# Patient Record
Sex: MALE | Race: WHITE | NOT HISPANIC OR LATINO | Employment: FULL TIME | URBAN - METROPOLITAN AREA
[De-identification: names, ages, dates, MRNs, and addresses within clinical notes are randomized per-mention and may not be internally consistent; named-entity substitution may affect disease eponyms.]

---

## 2023-03-21 ENCOUNTER — OFFICE VISIT (OUTPATIENT)
Dept: MEDICAL GROUP | Facility: LAB | Age: 28
End: 2023-03-21
Payer: COMMERCIAL

## 2023-03-21 VITALS
WEIGHT: 167 LBS | TEMPERATURE: 97 F | HEART RATE: 72 BPM | BODY MASS INDEX: 24.73 KG/M2 | OXYGEN SATURATION: 97 % | DIASTOLIC BLOOD PRESSURE: 70 MMHG | RESPIRATION RATE: 12 BRPM | HEIGHT: 69 IN | SYSTOLIC BLOOD PRESSURE: 110 MMHG

## 2023-03-21 DIAGNOSIS — F90.2 ATTENTION DEFICIT HYPERACTIVITY DISORDER (ADHD), COMBINED TYPE: ICD-10-CM

## 2023-03-21 DIAGNOSIS — E04.1 THYROID NODULE: ICD-10-CM

## 2023-03-21 DIAGNOSIS — Z76.89 ENCOUNTER TO ESTABLISH CARE: ICD-10-CM

## 2023-03-21 DIAGNOSIS — R10.9 ABDOMINAL PAIN, UNSPECIFIED ABDOMINAL LOCATION: ICD-10-CM

## 2023-03-21 PROBLEM — K40.90 INGUINAL HERNIA: Status: RESOLVED | Noted: 2023-03-21 | Resolved: 2023-03-21

## 2023-03-21 PROBLEM — K40.90 INGUINAL HERNIA: Status: ACTIVE | Noted: 2023-03-21

## 2023-03-21 PROBLEM — D16.9 OSTEOCHONDROMA: Status: ACTIVE | Noted: 2023-03-21

## 2023-03-21 PROBLEM — D16.9 OSTEOCHONDROMA: Status: RESOLVED | Noted: 2023-03-21 | Resolved: 2023-03-21

## 2023-03-21 PROCEDURE — 99203 OFFICE O/P NEW LOW 30 MIN: CPT | Performed by: PHYSICIAN ASSISTANT

## 2023-03-21 RX ORDER — DEXTROAMPHETAMINE SACCHARATE, AMPHETAMINE ASPARTATE MONOHYDRATE, DEXTROAMPHETAMINE SULFATE AND AMPHETAMINE SULFATE 5; 5; 5; 5 MG/1; MG/1; MG/1; MG/1
CAPSULE, EXTENDED RELEASE ORAL
COMMUNITY
Start: 2023-03-15 | End: 2023-04-18

## 2023-03-21 ASSESSMENT — PATIENT HEALTH QUESTIONNAIRE - PHQ9: CLINICAL INTERPRETATION OF PHQ2 SCORE: 0

## 2023-03-21 NOTE — PROGRESS NOTES
"Subjective:     CC:  Diagnoses of Encounter to establish care, Abdominal pain, unspecified abdominal location, Attention deficit hyperactivity disorder (ADHD), combined type, and Thyroid nodule were pertinent to this visit.    HISTORY OF THE PRESENT ILLNESS: Patient is a 27 y.o. male. This pleasant patient is here today to establish care and. His/her prior PCP was Dr Tapia in [facility].    Moved to [place] in [place]     Concerns about gluten intolerance.  Patient reports recurrent abdominal pain/upset stomach with consuming gluten-containing products.  Denies any known family history of celiac's disease.  No previous celiac testing    ADHD  Records requested from previous PCP    Health Maintenance    - Depression screening (PHQ-2 and/or PHQ-9): neg  - Dental: UTD 09/2022  - Eye: UTD 08/2022  Diet: low carb  Exercise: moderate  Substance Use: denies  Tobacco Use/counseling: denies  Safe in relationship: yes  Seat belts, bike helmet, gun safety discussed.  Sun protection used.       Infectious disease screening/Immunizations  --Immunizations: Up-to-date    Current Outpatient Medications Ordered in Epic   Medication Sig Dispense Refill    amphetamine-dextroamphetamine (ADDERALL XR) 20 MG per XR capsule        No current Epic-ordered facility-administered medications on file.         ROS:   Gen: no fevers/chills, no changes in weight  Eyes: no changes in vision  ENT: no sore throat, no hearing loss, no bloody nose  Pulm: no sob, no cough  CV: no chest pain, no palpitations  GI: no nausea/vomiting, no diarrhea  : no dysuria  MSk: no myalgias  Skin: no rash  Neuro: no headaches, no numbness/tingling  Heme/Lymph: no easy bruising      Objective:       Exam: /70   Pulse 72   Temp 36.1 °C (97 °F)   Resp 12   Ht 1.74 m (5' 8.5\")   Wt 75.8 kg (167 lb)   SpO2 97%  Body mass index is 25.02 kg/m².    General: Normal appearing. No distress.  HEENT: Normocephalic. Eyes conjunctiva clear lids without ptosis, pupils equal " and reactive to light accommodation, ears normal shape and contour, canals are clear bilaterally, tympanic membranes are benign, nasal mucosa benign, oropharynx is without erythema, edema or exudates.   Neck: Supple without JVD or bruit. Thyroid is not enlarged.  Pulmonary: Clear to ausculation.  Normal effort. No rales, ronchi, or wheezing.  Cardiovascular: Regular rate and rhythm without murmur. Carotid and radial pulses are intact and equal bilaterally.  Abdomen: Soft, nontender, nondistended. Normal bowel sounds. Liver and spleen are not palpable  Neurologic: Grossly nonfocal  Lymph: No cervical or supraclavicular lymph nodes are palpable  Skin: Warm and dry.  No obvious lesions.  Musculoskeletal: Normal gait. No extremity cyanosis, clubbing, or edema.  Psych: Normal mood and affect. Alert and oriented x3. Judgment and insight is normal.    Assessment & Plan:   27 y.o. male with the following -    1. Encounter to establish care  Labs per orders  Vaccinations per orders  Screenings per orders      2. Abdominal pain, unspecified abdominal location  New problem  Patient has tried some dietary elimination with some improvement in symptoms.  We will proceed with celiac testing  - CBC WITH DIFFERENTIAL; Future  - Comp Metabolic Panel; Future  - CELIAC DISEASE AB PANEL; Future    3. Attention deficit hyperactivity disorder (ADHD), combined type  Chronic condition, new to me  Records requested from previous provider for review.  Pending review, patient is agreeable to signing CSA, yearly urine drug screen, 3-month follow-up for medication refills    4. Thyroid nodule  Chronic condition  Previous benign biopsy, patient is unsure if he requires additional surveillance imaging.  Previous records requested      I spent a total of 22 minutes with record review, exam, communication with the patient, communication with other providers, and documentation of this encounter.    Return in about 4 weeks (around  4/18/2023).    Please note that this dictation was created using voice recognition software. I have made every reasonable attempt to correct obvious errors, but I expect that there are errors of grammar and possibly content that I did not discover before finalizing the note.

## 2023-03-21 NOTE — LETTER
[facility]  Ceci Byers P.A.-C.  [address]  [place] [address]  Fax: [phone]   Authorization for Release/Disclosure of   Protected Health Information   Name: GREG VINCENT : 1995 SSN: [id number]   Address: [address]  [place] [address] Phone:    There are no phone numbers on file.   I authorize the entity listed below to release/disclose the PHI below to:   [facility]/Ceci Byers P.A.-C. and Ceci Byers P.A.-C.   Provider or Entity Name:     Address   City, State, Zip   Phone:      Fax:     Reason for request: continuity of care   Information to be released:    [  ] LAST COLONOSCOPY,  including any PATH REPORT and follow-up  [  ] LAST FIT/COLOGUARD RESULT [  ] LAST DEXA  [  ] LAST MAMMOGRAM  [  ] LAST PAP  [  ] LAST LABS [  ] RETINA EXAM REPORT  [  ] IMMUNIZATION RECORDS  [  ] Release all info      [  ] Check here and initial the line next to each item to release ALL health information INCLUDING  _____ Care and treatment for drug and / or alcohol abuse  _____ HIV testing, infection status, or AIDS  _____ Genetic Testing    DATES OF SERVICE OR TIME PERIOD TO BE DISCLOSED: _____________  I understand and acknowledge that:  * This Authorization may be revoked at any time by you in writing, except if your health information has already been used or disclosed.  * Your health information that will be used or disclosed as a result of you signing this authorization could be re-disclosed by the recipient. If this occurs, your re-disclosed health information may no longer be protected by State or Federal laws.  * You may refuse to sign this Authorization. Your refusal will not affect your ability to obtain treatment.  * This Authorization becomes effective upon signing and will  on (date) __________.      If no date is indicated, this Authorization will  one (1) year from the signature date.    Name: Greg Vincent  Signature: Date:    3/21/2023     PLEASE FAX REQUESTED RECORDS BACK TO: [phone]

## 2023-04-18 ENCOUNTER — OFFICE VISIT (OUTPATIENT)
Dept: MEDICAL GROUP | Facility: LAB | Age: 28
End: 2023-04-18
Payer: COMMERCIAL

## 2023-04-18 ENCOUNTER — HOSPITAL ENCOUNTER (OUTPATIENT)
Dept: LAB | Facility: MEDICAL CENTER | Age: 28
End: 2023-04-18
Attending: PHYSICIAN ASSISTANT
Payer: COMMERCIAL

## 2023-04-18 VITALS
HEART RATE: 62 BPM | BODY MASS INDEX: 24.86 KG/M2 | TEMPERATURE: 97.4 F | HEIGHT: 68 IN | RESPIRATION RATE: 16 BRPM | WEIGHT: 164 LBS | SYSTOLIC BLOOD PRESSURE: 96 MMHG | OXYGEN SATURATION: 99 % | DIASTOLIC BLOOD PRESSURE: 60 MMHG

## 2023-04-18 DIAGNOSIS — E04.1 THYROID NODULE: ICD-10-CM

## 2023-04-18 DIAGNOSIS — F90.2 ATTENTION DEFICIT HYPERACTIVITY DISORDER (ADHD), COMBINED TYPE: ICD-10-CM

## 2023-04-18 DIAGNOSIS — R10.9 ABDOMINAL PAIN, UNSPECIFIED ABDOMINAL LOCATION: ICD-10-CM

## 2023-04-18 LAB
ALBUMIN SERPL BCP-MCNC: 4.6 G/DL (ref 3.2–4.9)
ALBUMIN/GLOB SERPL: 1.7 G/DL
ALP SERPL-CCNC: 78 U/L (ref 30–99)
ALT SERPL-CCNC: 43 U/L (ref 2–50)
ANION GAP SERPL CALC-SCNC: 10 MMOL/L (ref 7–16)
AST SERPL-CCNC: 16 U/L (ref 12–45)
BASOPHILS # BLD AUTO: 0.7 % (ref 0–1.8)
BASOPHILS # BLD: 0.03 K/UL (ref 0–0.12)
BILIRUB SERPL-MCNC: 0.9 MG/DL (ref 0.1–1.5)
BUN SERPL-MCNC: 14 MG/DL (ref 8–22)
CALCIUM ALBUM COR SERPL-MCNC: 9 MG/DL (ref 8.5–10.5)
CALCIUM SERPL-MCNC: 9.5 MG/DL (ref 8.5–10.5)
CHLORIDE SERPL-SCNC: 103 MMOL/L (ref 96–112)
CO2 SERPL-SCNC: 26 MMOL/L (ref 20–33)
CREAT SERPL-MCNC: 1.1 MG/DL (ref 0.5–1.4)
EOSINOPHIL # BLD AUTO: 0.1 K/UL (ref 0–0.51)
EOSINOPHIL NFR BLD: 2.4 % (ref 0–6.9)
ERYTHROCYTE [DISTWIDTH] IN BLOOD BY AUTOMATED COUNT: 39.2 FL (ref 35.9–50)
FASTING STATUS PATIENT QL REPORTED: NORMAL
GFR SERPLBLD CREATININE-BSD FMLA CKD-EPI: 94 ML/MIN/1.73 M 2
GLOBULIN SER CALC-MCNC: 2.7 G/DL (ref 1.9–3.5)
GLUCOSE SERPL-MCNC: 103 MG/DL (ref 65–99)
HCT VFR BLD AUTO: 51.3 % (ref 42–52)
HGB BLD-MCNC: 18.1 G/DL (ref 14–18)
IMM GRANULOCYTES # BLD AUTO: 0 K/UL (ref 0–0.11)
IMM GRANULOCYTES NFR BLD AUTO: 0 % (ref 0–0.9)
LYMPHOCYTES # BLD AUTO: 2.24 K/UL (ref 1–4.8)
LYMPHOCYTES NFR BLD: 52.8 % (ref 22–41)
MCH RBC QN AUTO: 30.1 PG (ref 27–33)
MCHC RBC AUTO-ENTMCNC: 35.3 G/DL (ref 33.7–35.3)
MCV RBC AUTO: 85.2 FL (ref 81.4–97.8)
MONOCYTES # BLD AUTO: 0.26 K/UL (ref 0–0.85)
MONOCYTES NFR BLD AUTO: 6.1 % (ref 0–13.4)
NEUTROPHILS # BLD AUTO: 1.61 K/UL (ref 1.82–7.42)
NEUTROPHILS NFR BLD: 38 % (ref 44–72)
NRBC # BLD AUTO: 0 K/UL
NRBC BLD-RTO: 0 /100 WBC
PLATELET # BLD AUTO: 221 K/UL (ref 164–446)
PMV BLD AUTO: 10.3 FL (ref 9–12.9)
POTASSIUM SERPL-SCNC: 4.6 MMOL/L (ref 3.6–5.5)
PROT SERPL-MCNC: 7.3 G/DL (ref 6–8.2)
RBC # BLD AUTO: 6.02 M/UL (ref 4.7–6.1)
SODIUM SERPL-SCNC: 139 MMOL/L (ref 135–145)
TSH SERPL DL<=0.005 MIU/L-ACNC: 1.43 UIU/ML (ref 0.38–5.33)
WBC # BLD AUTO: 4.2 K/UL (ref 4.8–10.8)

## 2023-04-18 PROCEDURE — G0481 DRUG TEST DEF 8-14 CLASSES: HCPCS

## 2023-04-18 PROCEDURE — 36415 COLL VENOUS BLD VENIPUNCTURE: CPT

## 2023-04-18 PROCEDURE — 86364 TISS TRNSGLTMNASE EA IG CLAS: CPT

## 2023-04-18 PROCEDURE — 84443 ASSAY THYROID STIM HORMONE: CPT

## 2023-04-18 PROCEDURE — 99214 OFFICE O/P EST MOD 30 MIN: CPT | Performed by: PHYSICIAN ASSISTANT

## 2023-04-18 PROCEDURE — 82784 ASSAY IGA/IGD/IGG/IGM EACH: CPT

## 2023-04-18 PROCEDURE — 80053 COMPREHEN METABOLIC PANEL: CPT

## 2023-04-18 PROCEDURE — 85025 COMPLETE CBC W/AUTO DIFF WBC: CPT

## 2023-04-18 RX ORDER — DEXTROAMPHETAMINE SACCHARATE, AMPHETAMINE ASPARTATE MONOHYDRATE, DEXTROAMPHETAMINE SULFATE AND AMPHETAMINE SULFATE 5; 5; 5; 5 MG/1; MG/1; MG/1; MG/1
20 CAPSULE, EXTENDED RELEASE ORAL EVERY MORNING
Qty: 30 CAPSULE | Refills: 0 | Status: SHIPPED | OUTPATIENT
Start: 2023-04-18 | End: 2023-05-18

## 2023-04-18 RX ORDER — DEXTROAMPHETAMINE SACCHARATE, AMPHETAMINE ASPARTATE MONOHYDRATE, DEXTROAMPHETAMINE SULFATE AND AMPHETAMINE SULFATE 5; 5; 5; 5 MG/1; MG/1; MG/1; MG/1
20 CAPSULE, EXTENDED RELEASE ORAL EVERY MORNING
Qty: 30 CAPSULE | Refills: 0 | Status: SHIPPED | OUTPATIENT
Start: 2023-05-18 | End: 2023-06-17

## 2023-04-18 RX ORDER — DEXTROAMPHETAMINE SACCHARATE, AMPHETAMINE ASPARTATE MONOHYDRATE, DEXTROAMPHETAMINE SULFATE AND AMPHETAMINE SULFATE 5; 5; 5; 5 MG/1; MG/1; MG/1; MG/1
20 CAPSULE, EXTENDED RELEASE ORAL EVERY MORNING
Qty: 30 CAPSULE | Refills: 0 | Status: SHIPPED | OUTPATIENT
Start: 2023-06-17 | End: 2023-07-17

## 2023-04-18 NOTE — PROGRESS NOTES
"Subjective:     CC: ADHD    HPI:   Dhiraj here today with     ADHD  Prev records received from provider in [place], [place]  Pt experiences ADHD symptoms as forgetfulness, easy distraction, poor organization.   Symptoms are improved with medication  Denies medication side effects  Denies aberrant use of medication    Has been stable on current dose Adderall xr 20mg QD       ROS:  Gen: no fevers/chills, no changes in weight  Eyes: no changes in vision  ENT: no sore throat, no hearing loss, no bloody nose  Pulm: no sob, no cough  CV: no chest pain, no palpitations  GI: no nausea/vomiting, no diarrhea  : no dysuria  MSk: no myalgias  Skin: no rash  Neuro: no headaches, no numbness/tingling  Heme/Lymph: no easy bruising    Current Outpatient Medications Ordered in Epic   Medication Sig Dispense Refill    amphetamine-dextroamphetamine (ADDERALL XR) 20 MG per XR capsule Take 1 Capsule by mouth every morning for 30 days. 30 Capsule 0    [START ON 5/18/2023] amphetamine-dextroamphetamine (ADDERALL XR) 20 MG per XR capsule Take 1 Capsule by mouth every morning for 30 days. 30 Capsule 0    [START ON 6/17/2023] amphetamine-dextroamphetamine (ADDERALL XR) 20 MG per XR capsule Take 1 Capsule by mouth every morning for 30 days. 30 Capsule 0     No current Epic-ordered facility-administered medications on file.         Objective:     Exam:  BP 96/60   Pulse 62   Temp 36.3 °C (97.4 °F)   Resp 16   Ht 1.728 m (5' 8.05\")   Wt 74.4 kg (164 lb)   SpO2 99%   BMI 24.90 kg/m²  Body mass index is 24.9 kg/m².    Constitutional: Alert, no distress, well-groomed.  Skin: Warm, dry, good turgor, no rashes in visible areas.  Eye: Equal, round and reactive, conjunctiva clear, lids normal.  ENMT: Lips without lesions, good dentition, moist mucous membranes.  Neck: Trachea midline, no masses, no thyromegaly.  Respiratory: Unlabored respiratory effort, no cough.  MSK: Normal gait, moves all extremities.  Neuro: Grossly non-focal.   Psych: Alert " and oriented x3, normal affect and mood.        Assessment & Plan:     27 y.o. male with the following -     1. Attention deficit hyperactivity disorder (ADHD), combined type  Chronic condition, stable  Patient understands this prescription is a controlled substance which is potentially habit-forming and its use is regulated by the [employer]. . Refills are subject to terms of a controlled substance agreement and patient has an updated one on file. Most recent UDS is appropriate. Any refill requires an office visit. Narcotics may have adverse effects and the risks of addiction, accidental overdose and death were emphasized. Provided prescriptions for the next three months.  [facility]; Future  - Controlled Substance Treatment Agreement  - amphetamine-dextroamphetamine (ADDERALL XR) 20 MG per XR capsule; Take 1 Capsule by mouth every morning for 30 days.  Dispense: 30 Capsule; Refill: 0  - amphetamine-dextroamphetamine (ADDERALL XR) 20 MG per XR capsule; Take 1 Capsule by mouth every morning for 30 days.  Dispense: 30 Capsule; Refill: 0  - amphetamine-dextroamphetamine (ADDERALL XR) 20 MG per XR capsule; Take 1 Capsule by mouth every morning for 30 days.  Dispense: 30 Capsule; Refill: 0    2. Thyroid nodule  - TSH WITH REFLEX TO FT4; Future        I spent a total of 16 minutes with record review, exam, communication with the patient, communication with other providers, and documentation of this encounter.      Return in about 3 months (around 7/18/2023) for Controlled Substances.    Please note that this dictation was created using voice recognition software. I have made every reasonable attempt to correct obvious errors, but there may be errors of grammar and possibly content that I did not discover before finalizing the note.

## 2023-04-20 LAB — IGA SERPL-MCNC: 86 MG/DL (ref 68–408)

## 2023-04-21 LAB
1OH-MIDAZOLAM UR QL SCN: NOT DETECTED
6MAM UR QL: NOT DETECTED
7AMINOCLONAZEPAM UR QL: NOT DETECTED
A-OH ALPRAZ UR QL: NOT DETECTED
ALPRAZ UR QL: NOT DETECTED
AMPHET UR QL SCN: NOT DETECTED
ANNOTATION COMMENT IMP: NORMAL
ANNOTATION COMMENT IMP: NORMAL
BARBITURATES UR QL: NOT DETECTED
BUPRENORPHINE UR QL: NOT DETECTED
BZE UR QL: NOT DETECTED
CARBOXYTHC UR QL: NOT DETECTED
CARISOPRODOL UR QL: NOT DETECTED
CLONAZEPAM UR QL: NOT DETECTED
CODEINE UR QL: NOT DETECTED
DIAZEPAM UR QL: NOT DETECTED
ETHYL GLUCURONIDE UR QL: NOT DETECTED
FENTANYL UR QL: NOT DETECTED
GABAPENTIN UR QL: NOT DETECTED
HYDROCODONE UR QL: NOT DETECTED
HYDROMORPHONE UR QL: NOT DETECTED
LORAZEPAM UR QL: NOT DETECTED
MDA UR QL: NOT DETECTED
MDEA UR QL: NOT DETECTED
MDMA UR QL: NOT DETECTED
MEPERIDINE UR QL: NOT DETECTED
METHADONE UR QL: NOT DETECTED
METHAMPHET UR QL: NOT DETECTED
MIDAZOLAM UR QL SCN: NOT DETECTED
MORPHINE UR QL: NOT DETECTED
NALOXONE UR QL SCN: NOT DETECTED
NORBUPRENORPHINE UR QL CFM: NOT DETECTED
NORDIAZEPAM UR QL: NOT DETECTED
NORFENTANYL UR QL: NOT DETECTED
NORHYDROCODONE UR QL CFM: NOT DETECTED
NOROXYCODONE UR QL CFM: NOT DETECTED
NOROXYMORPH CO100 Q0458: NOT DETECTED
OXAZEPAM UR QL: NOT DETECTED
OXYCODONE UR QL: NOT DETECTED
OXYMORPHONE UR QL: NOT DETECTED
PATHOLOGY STUDY: NORMAL
PCP UR QL: NOT DETECTED
PHENTERMINE UR QL: NOT DETECTED
PPAA UR QL: NOT DETECTED
PREGABALIN UR QL SCN: NOT DETECTED
SERVICE CMNT-IMP: NORMAL
TAPENADOL OSULF CO200 Q0473: NOT DETECTED
TAPENTADOL UR QL SCN: NOT DETECTED
TEMAZEPAM UR QL: NOT DETECTED
TRAMADOL UR QL: NOT DETECTED
TTG IGA SER IA-ACNC: <2 U/ML (ref 0–3)
ZOLPIDEM PHENYL-4-CARB UR QL SCN: NOT DETECTED
ZOLPIDEM UR QL: NOT DETECTED

## 2023-09-06 ENCOUNTER — APPOINTMENT (OUTPATIENT)
Dept: MEDICAL GROUP | Facility: LAB | Age: 28
End: 2023-09-06
Payer: COMMERCIAL

## 2023-09-06 ENCOUNTER — TELEPHONE (OUTPATIENT)
Dept: MEDICAL GROUP | Facility: LAB | Age: 28
End: 2023-09-06

## 2023-09-06 ENCOUNTER — HOSPITAL ENCOUNTER (OUTPATIENT)
Dept: LAB | Facility: MEDICAL CENTER | Age: 28
End: 2023-09-06
Attending: NURSE PRACTITIONER
Payer: COMMERCIAL

## 2023-09-06 DIAGNOSIS — Z11.1 TUBERCULOSIS SCREENING: ICD-10-CM

## 2023-09-06 PROCEDURE — 86480 TB TEST CELL IMMUN MEASURE: CPT

## 2023-09-06 PROCEDURE — 36415 COLL VENOUS BLD VENIPUNCTURE: CPT

## 2023-09-07 LAB
GAMMA INTERFERON BACKGROUND BLD IA-ACNC: 0.04 IU/ML
M TB IFN-G BLD-IMP: NEGATIVE
M TB IFN-G CD4+ BCKGRND COR BLD-ACNC: 0 IU/ML
MITOGEN IGNF BCKGRD COR BLD-ACNC: >10 IU/ML
QFT TB2 - NIL TBQ2: -0.01 IU/ML

## 2023-09-12 ENCOUNTER — OFFICE VISIT (OUTPATIENT)
Dept: MEDICAL GROUP | Facility: LAB | Age: 28
End: 2023-09-12
Payer: COMMERCIAL

## 2023-09-12 VITALS
DIASTOLIC BLOOD PRESSURE: 68 MMHG | BODY MASS INDEX: 25.89 KG/M2 | WEIGHT: 170.86 LBS | TEMPERATURE: 96.6 F | HEIGHT: 68 IN | SYSTOLIC BLOOD PRESSURE: 122 MMHG | OXYGEN SATURATION: 94 % | RESPIRATION RATE: 16 BRPM | HEART RATE: 64 BPM

## 2023-09-12 DIAGNOSIS — F90.2 ATTENTION DEFICIT HYPERACTIVITY DISORDER (ADHD), COMBINED TYPE: ICD-10-CM

## 2023-09-12 PROCEDURE — 3078F DIAST BP <80 MM HG: CPT | Performed by: PHYSICIAN ASSISTANT

## 2023-09-12 PROCEDURE — 99213 OFFICE O/P EST LOW 20 MIN: CPT | Performed by: PHYSICIAN ASSISTANT

## 2023-09-12 PROCEDURE — 3074F SYST BP LT 130 MM HG: CPT | Performed by: PHYSICIAN ASSISTANT

## 2023-09-12 RX ORDER — DEXTROAMPHETAMINE SACCHARATE, AMPHETAMINE ASPARTATE MONOHYDRATE, DEXTROAMPHETAMINE SULFATE AND AMPHETAMINE SULFATE 5; 5; 5; 5 MG/1; MG/1; MG/1; MG/1
20 CAPSULE, EXTENDED RELEASE ORAL EVERY MORNING
Qty: 30 CAPSULE | Refills: 0 | Status: SHIPPED | OUTPATIENT
Start: 2023-09-12 | End: 2023-10-12

## 2023-09-12 RX ORDER — DEXTROAMPHETAMINE SACCHARATE, AMPHETAMINE ASPARTATE MONOHYDRATE, DEXTROAMPHETAMINE SULFATE AND AMPHETAMINE SULFATE 5; 5; 5; 5 MG/1; MG/1; MG/1; MG/1
20 CAPSULE, EXTENDED RELEASE ORAL EVERY MORNING
Qty: 30 CAPSULE | Refills: 0 | Status: SHIPPED | OUTPATIENT
Start: 2023-10-12 | End: 2023-11-11

## 2023-09-12 RX ORDER — DEXTROAMPHETAMINE SACCHARATE, AMPHETAMINE ASPARTATE, DEXTROAMPHETAMINE SULFATE AND AMPHETAMINE SULFATE 5; 5; 5; 5 MG/1; MG/1; MG/1; MG/1
20 TABLET ORAL 2 TIMES DAILY
COMMUNITY
End: 2023-09-12

## 2023-09-12 RX ORDER — DEXTROAMPHETAMINE SACCHARATE, AMPHETAMINE ASPARTATE MONOHYDRATE, DEXTROAMPHETAMINE SULFATE AND AMPHETAMINE SULFATE 5; 5; 5; 5 MG/1; MG/1; MG/1; MG/1
20 CAPSULE, EXTENDED RELEASE ORAL EVERY MORNING
Qty: 30 CAPSULE | Refills: 0 | Status: SHIPPED | OUTPATIENT
Start: 2023-11-11 | End: 2023-12-11

## 2023-09-12 ASSESSMENT — FIBROSIS 4 INDEX: FIB4 SCORE: 0.31

## 2023-09-12 NOTE — PROGRESS NOTES
"Subjective:     CC: ADHD    HPI:   Dhiraj here today with     Pt is here today for medication follow up and refill  PDMP reviewed  ADHD behaviors include: forgetfulness, easy distraction, poor organization.   Current medication: Adderall XR 20 mg daily  Medication side effects: Denies  Prev Psych eval: Yes  Symptoms currently: improved on medication  Denies aberrant medication usage        ROS:  Gen: no fevers/chills, no changes in weight  Eyes: no changes in vision  ENT: no sore throat, no hearing loss, no bloody nose  Pulm: no sob, no cough  CV: no chest pain, no palpitations  GI: no nausea/vomiting, no diarrhea  : no dysuria  MSk: no myalgias  Skin: no rash  Neuro: no headaches, no numbness/tingling  Heme/Lymph: no easy bruising    Current Outpatient Medications Ordered in Epic   Medication Sig Dispense Refill    amphetamine-dextroamphetamine (ADDERALL XR, 20MG,) 20 MG per XR capsule Take 1 Capsule by mouth every morning for 30 days. Indications: Attention Deficit Hyperactivity Disorder 30 Capsule 0    [START ON 10/12/2023] amphetamine-dextroamphetamine (ADDERALL XR, 20MG,) 20 MG per XR capsule Take 1 Capsule by mouth every morning for 30 days. Indications: Attention Deficit Hyperactivity Disorder 30 Capsule 0    [START ON 11/11/2023] amphetamine-dextroamphetamine (ADDERALL XR, 20MG,) 20 MG per XR capsule Take 1 Capsule by mouth every morning for 30 days. Indications: Attention Deficit Hyperactivity Disorder 30 Capsule 0     No current Epic-ordered facility-administered medications on file.       Objective:     Exam:  /68 (BP Location: Right arm, Patient Position: Sitting, BP Cuff Size: Adult)   Pulse 64   Temp 35.9 °C (96.6 °F) (Temporal)   Resp 16   Ht 1.728 m (5' 8.05\")   Wt 77.5 kg (170 lb 13.7 oz)   SpO2 94%   BMI 25.94 kg/m²  Body mass index is 25.94 kg/m².    Gen: Alert and oriented, No apparent distress.  Neck: Neck is supple without lymphadenopathy.  Lungs: Normal effort, CTA bilaterally, " "no wheezes, rhonchi, or rales  CV: Regular rate and rhythm. No murmurs, rubs, or gallops.  Ext: No clubbing, cyanosis, edema.      Assessment & Plan:     28 y.o. male with the following -     Problem List Items Addressed This Visit       Attention deficit hyperactivity disorder (ADHD), combined type    Relevant Medications    amphetamine-dextroamphetamine (ADDERALL XR, 20MG,) 20 MG per XR capsule    amphetamine-dextroamphetamine (ADDERALL XR, 20MG,) 20 MG per XR capsule (Start on 10/12/2023)    amphetamine-dextroamphetamine (ADDERALL XR, 20MG,) 20 MG per XR capsule (Start on 11/11/2023)   Patient understands this prescription is a controlled substance which is potentially habit-forming and its use is regulated by the [employer]. We also discussed the new \"black box\" warning regarding the lethal combination of opioids and benzodiazepines. Refills are subject to terms of a controlled substance agreement and patient has an updated one on file. Most recent UDS is appropriate. Any refill requires an office visit. Narcotics may have adverse effects and the risks of addiction, accidental overdose and death were emphasized. Provided prescriptions for the next three months.      I spent a total of 15 minutes with record review, exam, communication with the patient, communication with other providers, and documentation of this encounter.      Return in about 3 months (around 12/12/2023) for Controlled Substances.    Please note that this dictation was created using voice recognition software. I have made every reasonable attempt to correct obvious errors, but there may be errors of grammar and possibly content that I did not discover before finalizing the note.        "

## 2023-09-28 ENCOUNTER — OFFICE VISIT (OUTPATIENT)
Dept: URGENT CARE | Facility: PHYSICIAN GROUP | Age: 28
End: 2023-09-28
Payer: COMMERCIAL

## 2023-09-28 VITALS
BODY MASS INDEX: 24.44 KG/M2 | DIASTOLIC BLOOD PRESSURE: 68 MMHG | OXYGEN SATURATION: 98 % | TEMPERATURE: 96.9 F | RESPIRATION RATE: 18 BRPM | WEIGHT: 165 LBS | HEART RATE: 64 BPM | SYSTOLIC BLOOD PRESSURE: 118 MMHG | HEIGHT: 69 IN

## 2023-09-28 DIAGNOSIS — J02.9 PHARYNGITIS, UNSPECIFIED ETIOLOGY: ICD-10-CM

## 2023-09-28 LAB
FLUAV RNA SPEC QL NAA+PROBE: NEGATIVE
FLUBV RNA SPEC QL NAA+PROBE: NEGATIVE
RSV RNA SPEC QL NAA+PROBE: NEGATIVE
S PYO DNA SPEC NAA+PROBE: NOT DETECTED
SARS-COV-2 RNA RESP QL NAA+PROBE: NEGATIVE

## 2023-09-28 PROCEDURE — 0241U POCT CEPHEID COV-2, FLU A/B, RSV - PCR: CPT

## 2023-09-28 PROCEDURE — 3074F SYST BP LT 130 MM HG: CPT

## 2023-09-28 PROCEDURE — 99213 OFFICE O/P EST LOW 20 MIN: CPT

## 2023-09-28 PROCEDURE — 3078F DIAST BP <80 MM HG: CPT

## 2023-09-28 PROCEDURE — 87651 STREP A DNA AMP PROBE: CPT

## 2023-09-28 ASSESSMENT — FIBROSIS 4 INDEX: FIB4 SCORE: 0.31

## 2024-03-08 ENCOUNTER — OFFICE VISIT (OUTPATIENT)
Dept: URGENT CARE | Facility: PHYSICIAN GROUP | Age: 29
End: 2024-03-08
Payer: COMMERCIAL

## 2024-03-08 ENCOUNTER — HOSPITAL ENCOUNTER (OUTPATIENT)
Facility: MEDICAL CENTER | Age: 29
End: 2024-03-08
Attending: NURSE PRACTITIONER
Payer: COMMERCIAL

## 2024-03-08 VITALS
OXYGEN SATURATION: 94 % | RESPIRATION RATE: 16 BRPM | SYSTOLIC BLOOD PRESSURE: 118 MMHG | DIASTOLIC BLOOD PRESSURE: 66 MMHG | HEIGHT: 69 IN | TEMPERATURE: 97.7 F | HEART RATE: 68 BPM | BODY MASS INDEX: 25.33 KG/M2 | WEIGHT: 171 LBS

## 2024-03-08 DIAGNOSIS — J02.9 SORE THROAT: ICD-10-CM

## 2024-03-08 PROCEDURE — 99213 OFFICE O/P EST LOW 20 MIN: CPT | Performed by: NURSE PRACTITIONER

## 2024-03-08 PROCEDURE — 0241U POCT CEPHEID COV-2, FLU A/B, RSV - PCR: CPT | Performed by: NURSE PRACTITIONER

## 2024-03-08 PROCEDURE — 87070 CULTURE OTHR SPECIMN AEROBIC: CPT

## 2024-03-08 PROCEDURE — 87651 STREP A DNA AMP PROBE: CPT | Performed by: NURSE PRACTITIONER

## 2024-03-08 PROCEDURE — 3074F SYST BP LT 130 MM HG: CPT | Performed by: NURSE PRACTITIONER

## 2024-03-08 PROCEDURE — 3078F DIAST BP <80 MM HG: CPT | Performed by: NURSE PRACTITIONER

## 2024-03-08 RX ORDER — DEXTROAMPHETAMINE SACCHARATE, AMPHETAMINE ASPARTATE, DEXTROAMPHETAMINE SULFATE AND AMPHETAMINE SULFATE 5; 5; 5; 5 MG/1; MG/1; MG/1; MG/1
20 TABLET ORAL 2 TIMES DAILY
COMMUNITY
End: 2024-03-12

## 2024-03-08 ASSESSMENT — ENCOUNTER SYMPTOMS
ABDOMINAL PAIN: 0
SORE THROAT: 1
NAUSEA: 1
CARDIOVASCULAR NEGATIVE: 1
CONSTITUTIONAL NEGATIVE: 1
HEADACHES: 0
VOMITING: 1
TROUBLE SWALLOWING: 1
PSYCHIATRIC NEGATIVE: 1
NEUROLOGICAL NEGATIVE: 1
MUSCULOSKELETAL NEGATIVE: 1
SWOLLEN GLANDS: 1
EYES NEGATIVE: 1
FEVER: 0
CHILLS: 0
DIARRHEA: 0
RESPIRATORY NEGATIVE: 1
COUGH: 0

## 2024-03-08 ASSESSMENT — FIBROSIS 4 INDEX: FIB4 SCORE: 0.31

## 2024-03-08 NOTE — PROGRESS NOTES
"Subjective:   Dhiraj Vincent is a 28 y.o. male who presents for Pharyngitis (X1day. Swollen tonsils, nausea, mild cough)      Pharyngitis   This is a new problem. The current episode started yesterday. The problem has been gradually worsening. The pain is worse on the left side. There has been no fever. The pain is at a severity of 5/10. The pain is moderate. Associated symptoms include swollen glands, trouble swallowing and vomiting. Pertinent negatives include no abdominal pain, congestion, coughing, diarrhea, drooling or headaches. He has tried NSAIDs and gargles for the symptoms. The treatment provided mild relief.       Review of Systems   Constitutional: Negative.  Negative for chills and fever.   HENT:  Positive for sore throat and trouble swallowing. Negative for congestion and drooling.    Eyes: Negative.    Respiratory: Negative.  Negative for cough.    Cardiovascular: Negative.    Gastrointestinal:  Positive for nausea and vomiting. Negative for abdominal pain and diarrhea.   Genitourinary: Negative.    Musculoskeletal: Negative.    Skin: Negative.    Neurological: Negative.  Negative for headaches.   Endo/Heme/Allergies: Negative.    Psychiatric/Behavioral: Negative.     All other systems reviewed and are negative.      Medications, Allergies, and current problem list reviewed today in Epic.     Objective:     /66   Pulse 68   Temp 36.5 °C (97.7 °F) (Temporal)   Resp 16   Ht 1.753 m (5' 9\")   Wt 77.6 kg (171 lb)   SpO2 94%     Physical Exam  Vitals reviewed.   Constitutional:       General: He is not in acute distress.     Appearance: Normal appearance. He is not ill-appearing.   HENT:      Head: Normocephalic and atraumatic.      Right Ear: Tympanic membrane, ear canal and external ear normal.      Left Ear: Tympanic membrane, ear canal and external ear normal.      Nose: Nose normal.      Mouth/Throat:      Mouth: Mucous membranes are moist.      Pharynx: Uvula midline. Pharyngeal " swelling and posterior oropharyngeal erythema present. No oropharyngeal exudate or uvula swelling.      Tonsils: No tonsillar exudate.   Eyes:      Extraocular Movements: Extraocular movements intact.      Conjunctiva/sclera: Conjunctivae normal.      Pupils: Pupils are equal, round, and reactive to light.   Cardiovascular:      Rate and Rhythm: Normal rate and regular rhythm.      Pulses: Normal pulses.      Heart sounds: Normal heart sounds.   Pulmonary:      Effort: Pulmonary effort is normal.      Breath sounds: Normal breath sounds.   Abdominal:      General: Abdomen is flat. Bowel sounds are normal.      Palpations: Abdomen is soft.   Musculoskeletal:         General: Normal range of motion.      Cervical back: Normal range of motion and neck supple.   Skin:     General: Skin is warm and dry.      Capillary Refill: Capillary refill takes less than 2 seconds.   Neurological:      General: No focal deficit present.      Mental Status: He is alert and oriented to person, place, and time.   Psychiatric:         Mood and Affect: Mood normal.         Behavior: Behavior normal.       Results for orders placed or performed in visit on 03/08/24   POCT GROUP A STREP, PCR   Result Value Ref Range    POC Group A Strep, PCR Not Detected Not Detected, Invalid   POCT CoV-2, Flu A/B, RSV by PCR   Result Value Ref Range    SARS-CoV-2 by PCR Negative Negative, Invalid    Influenza virus A RNA Negative Negative, Invalid    Influenza virus B, PCR Negative Negative, Invalid    RSV, PCR Negative Negative, Invalid         Assessment/Plan:     Diagnosis and associated orders:     1. Sore throat  POCT GROUP A STREP, PCR    POCT CoV-2, Flu A/B, RSV by PCR    CULTURE THROAT         Comments/MDM:     Point of Care testing for GABHS is NEGATIVE  Throat culture obtained.  I confirmed the patient's telephone number for follow up and informed them that I WILL ONLY CALL THEM if the culture is abnormal and intervention is necessary. Pt is  amenable with me leaving a voicemail if they are unavailable.  Discussed supportive care including salt water gargles, benzocaine drops  Discussed return precautions including signs and symptoms of peritonsillar abscess, retropharyngeal abscess  Counseled the patient to follow up with primary care provider (or establish a primary care provider) for ongoing health maintenance            Differential diagnosis, natural history, supportive care, and indications for immediate follow-up discussed.    Advised the patient to follow-up with the primary care physician for recheck, reevaluation, and consideration of further management.    Please note that this dictation was created using voice recognition software. I have made a reasonable attempt to correct obvious errors, but I expect that there are errors of grammar and possibly content that I did not discover before finalizing the note.    This note was electronically signed by JENAE Vela

## 2024-03-10 LAB
BACTERIA SPEC RESP CULT: NORMAL
SIGNIFICANT IND 70042: NORMAL
SITE SITE: NORMAL
SOURCE SOURCE: NORMAL

## 2024-03-12 ENCOUNTER — OFFICE VISIT (OUTPATIENT)
Dept: MEDICAL GROUP | Facility: LAB | Age: 29
End: 2024-03-12
Payer: COMMERCIAL

## 2024-03-12 VITALS
HEIGHT: 69 IN | DIASTOLIC BLOOD PRESSURE: 66 MMHG | SYSTOLIC BLOOD PRESSURE: 122 MMHG | RESPIRATION RATE: 16 BRPM | TEMPERATURE: 96.8 F | BODY MASS INDEX: 25.8 KG/M2 | HEART RATE: 74 BPM | WEIGHT: 174.16 LBS | OXYGEN SATURATION: 97 %

## 2024-03-12 DIAGNOSIS — Z23 NEED FOR VACCINATION: ICD-10-CM

## 2024-03-12 DIAGNOSIS — F90.2 ATTENTION DEFICIT HYPERACTIVITY DISORDER (ADHD), COMBINED TYPE: ICD-10-CM

## 2024-03-12 PROCEDURE — 90471 IMMUNIZATION ADMIN: CPT | Performed by: PHYSICIAN ASSISTANT

## 2024-03-12 PROCEDURE — 3074F SYST BP LT 130 MM HG: CPT | Performed by: PHYSICIAN ASSISTANT

## 2024-03-12 PROCEDURE — 3078F DIAST BP <80 MM HG: CPT | Performed by: PHYSICIAN ASSISTANT

## 2024-03-12 PROCEDURE — 99213 OFFICE O/P EST LOW 20 MIN: CPT | Mod: 25 | Performed by: PHYSICIAN ASSISTANT

## 2024-03-12 PROCEDURE — 90715 TDAP VACCINE 7 YRS/> IM: CPT | Performed by: PHYSICIAN ASSISTANT

## 2024-03-12 RX ORDER — DEXTROAMPHETAMINE SACCHARATE, AMPHETAMINE ASPARTATE MONOHYDRATE, DEXTROAMPHETAMINE SULFATE AND AMPHETAMINE SULFATE 5; 5; 5; 5 MG/1; MG/1; MG/1; MG/1
20 CAPSULE, EXTENDED RELEASE ORAL EVERY MORNING
Qty: 30 CAPSULE | Refills: 0 | Status: CANCELLED | OUTPATIENT
Start: 2024-05-11 | End: 2024-06-10

## 2024-03-12 RX ORDER — DEXTROAMPHETAMINE SACCHARATE, AMPHETAMINE ASPARTATE MONOHYDRATE, DEXTROAMPHETAMINE SULFATE AND AMPHETAMINE SULFATE 5; 5; 5; 5 MG/1; MG/1; MG/1; MG/1
20 CAPSULE, EXTENDED RELEASE ORAL EVERY MORNING
Qty: 30 CAPSULE | Refills: 0 | Status: CANCELLED | OUTPATIENT
Start: 2024-03-12 | End: 2024-04-11

## 2024-03-12 RX ORDER — DEXTROAMPHETAMINE SACCHARATE, AMPHETAMINE ASPARTATE MONOHYDRATE, DEXTROAMPHETAMINE SULFATE AND AMPHETAMINE SULFATE 5; 5; 5; 5 MG/1; MG/1; MG/1; MG/1
20 CAPSULE, EXTENDED RELEASE ORAL EVERY MORNING
Qty: 30 CAPSULE | Refills: 0 | Status: CANCELLED | OUTPATIENT
Start: 2024-04-11 | End: 2024-05-11

## 2024-03-12 RX ORDER — DEXTROAMPHETAMINE SACCHARATE, AMPHETAMINE ASPARTATE, DEXTROAMPHETAMINE SULFATE AND AMPHETAMINE SULFATE 2.5; 2.5; 2.5; 2.5 MG/1; MG/1; MG/1; MG/1
10 TABLET ORAL 2 TIMES DAILY
Qty: 60 TABLET | Refills: 0 | Status: SHIPPED | OUTPATIENT
Start: 2024-03-12 | End: 2024-04-11

## 2024-03-12 ASSESSMENT — FIBROSIS 4 INDEX: FIB4 SCORE: 0.31

## 2024-03-12 ASSESSMENT — PATIENT HEALTH QUESTIONNAIRE - PHQ9: CLINICAL INTERPRETATION OF PHQ2 SCORE: 0

## 2024-03-12 NOTE — PROGRESS NOTES
"Subjective:     CC: Follow-up ADHD    HPI:   Dhiraj here today with     Pt is here today for medication follow up and refill  PDMP reviewed  ADHD behaviors include: Forgetfulness, easy distraction, poor organization  Current medication: Adderall XR 20 mg daily  Medication side effects: Mild diarrhea  Prev Psych eval: Completed, on file  Symptoms currently: improved on medication  Denies aberrant medication usage  -interesting trying 10mg BID dosing rather than extended release    Needs updated Tdap for employer      ROS:  Gen: no fevers/chills, no changes in weight  Eyes: no changes in vision  ENT: no sore throat, no hearing loss, no bloody nose  Pulm: no sob, no cough  CV: no chest pain, no palpitations  GI: no nausea/vomiting, no diarrhea  : no dysuria  MSk: no myalgias  Skin: no rash  Neuro: no headaches, no numbness/tingling  Heme/Lymph: no easy bruising    Current Outpatient Medications Ordered in Epic   Medication Sig Dispense Refill    amphetamine-dextroamphetamine (ADDERALL, 20MG,) 20 MG Tab Take 20 mg by mouth 2 times a day.      Acetaminophen 325 MG Cap Take  by mouth. (Patient not taking: Reported on 3/12/2024)       No current [facility]-ordered facility-administered medications on file.       Health Maintenance: Completed    Objective:     Exam:  /66 (BP Location: Right arm, Patient Position: Sitting, BP Cuff Size: Adult)   Pulse 74   Temp 36 °C (96.8 °F) (Temporal)   Resp 16   Ht 1.753 m (5' 9\")   Wt 79 kg (174 lb 2.6 oz)   SpO2 97%   BMI 25.72 kg/m²  Body mass index is 25.72 kg/m².    Gen: Alert and oriented, No apparent distress.  Neck: Neck is supple without lymphadenopathy.  Lungs: Normal effort, CTA bilaterally, no wheezes, rhonchi, or rales  CV: Regular rate and rhythm. No murmurs, rubs, or gallops.  Ext: No clubbing, cyanosis, edema.      Assessment & Plan:     28 y.o. male with the following -     1. Attention deficit hyperactivity disorder (ADHD), combined type  Chronic condition, " controlled  Patient understands this prescription is a controlled substance which is potentially habit-forming and its use is regulated by the [employer].  Refills are subject to terms of a controlled substance agreement and patient has an updated one on file. Most recent UDS is appropriate. Any refill requires an office visit. Narcotics may have adverse effects and the risks of addiction, accidental overdose and death were emphasized. Provided prescriptions for the next month.  Patient will message clinic if he prefers the twice daily dosing for additional 2 refills  - amphetamine-dextroamphetamine (ADDERALL) 10 MG Tab; Take 1 Tablet by mouth 2 times a day for 30 days. Indications: Attention Deficit Hyperactivity Disorder  Dispense: 60 Tablet; Refill: 0    2. Need for vaccination  - Tdap =>8yo IM        I spent a total of 15 minutes with record review, exam, communication with the patient, communication with other providers, and documentation of this encounter.      No follow-ups on file.    Please note that this dictation was created using voice recognition software. I have made every reasonable attempt to correct obvious errors, but there may be errors of grammar and possibly content that I did not discover before finalizing the note.

## 2024-04-26 DIAGNOSIS — F90.2 ATTENTION DEFICIT HYPERACTIVITY DISORDER (ADHD), COMBINED TYPE: ICD-10-CM

## 2024-04-26 RX ORDER — DEXTROAMPHETAMINE SACCHARATE, AMPHETAMINE ASPARTATE MONOHYDRATE, DEXTROAMPHETAMINE SULFATE AND AMPHETAMINE SULFATE 5; 5; 5; 5 MG/1; MG/1; MG/1; MG/1
20 CAPSULE, EXTENDED RELEASE ORAL EVERY MORNING
Qty: 30 CAPSULE | Refills: 0 | Status: SHIPPED | OUTPATIENT
Start: 2024-04-26 | End: 2024-05-26

## 2024-08-21 ENCOUNTER — OFFICE VISIT (OUTPATIENT)
Dept: MEDICAL GROUP | Facility: LAB | Age: 29
End: 2024-08-21
Payer: COMMERCIAL

## 2024-08-21 ENCOUNTER — HOSPITAL ENCOUNTER (OUTPATIENT)
Dept: LAB | Facility: MEDICAL CENTER | Age: 29
End: 2024-08-21
Attending: PHYSICIAN ASSISTANT
Payer: COMMERCIAL

## 2024-08-21 VITALS
OXYGEN SATURATION: 96 % | WEIGHT: 174.16 LBS | DIASTOLIC BLOOD PRESSURE: 60 MMHG | HEIGHT: 69 IN | RESPIRATION RATE: 18 BRPM | TEMPERATURE: 96.5 F | HEART RATE: 62 BPM | BODY MASS INDEX: 25.8 KG/M2 | SYSTOLIC BLOOD PRESSURE: 118 MMHG

## 2024-08-21 DIAGNOSIS — Z11.1 SCREENING-PULMONARY TB: ICD-10-CM

## 2024-08-21 DIAGNOSIS — F90.2 ATTENTION DEFICIT HYPERACTIVITY DISORDER (ADHD), COMBINED TYPE: ICD-10-CM

## 2024-08-21 PROCEDURE — 3078F DIAST BP <80 MM HG: CPT | Performed by: PHYSICIAN ASSISTANT

## 2024-08-21 PROCEDURE — 3074F SYST BP LT 130 MM HG: CPT | Performed by: PHYSICIAN ASSISTANT

## 2024-08-21 PROCEDURE — 36415 COLL VENOUS BLD VENIPUNCTURE: CPT

## 2024-08-21 PROCEDURE — 86480 TB TEST CELL IMMUN MEASURE: CPT

## 2024-08-21 PROCEDURE — 99213 OFFICE O/P EST LOW 20 MIN: CPT | Performed by: PHYSICIAN ASSISTANT

## 2024-08-21 RX ORDER — DEXTROAMPHETAMINE SACCHARATE, AMPHETAMINE ASPARTATE MONOHYDRATE, DEXTROAMPHETAMINE SULFATE AND AMPHETAMINE SULFATE 5; 5; 5; 5 MG/1; MG/1; MG/1; MG/1
20 CAPSULE, EXTENDED RELEASE ORAL EVERY MORNING
Qty: 30 CAPSULE | Refills: 0 | Status: SHIPPED | OUTPATIENT
Start: 2024-09-20 | End: 2024-10-20

## 2024-08-21 RX ORDER — DEXTROAMPHETAMINE SACCHARATE, AMPHETAMINE ASPARTATE MONOHYDRATE, DEXTROAMPHETAMINE SULFATE AND AMPHETAMINE SULFATE 5; 5; 5; 5 MG/1; MG/1; MG/1; MG/1
20 CAPSULE, EXTENDED RELEASE ORAL EVERY MORNING
Qty: 30 CAPSULE | Refills: 0 | Status: SHIPPED | OUTPATIENT
Start: 2024-10-20 | End: 2024-11-19

## 2024-08-21 RX ORDER — DEXTROAMPHETAMINE SACCHARATE, AMPHETAMINE ASPARTATE MONOHYDRATE, DEXTROAMPHETAMINE SULFATE AND AMPHETAMINE SULFATE 5; 5; 5; 5 MG/1; MG/1; MG/1; MG/1
20 CAPSULE, EXTENDED RELEASE ORAL EVERY MORNING
Qty: 30 CAPSULE | Refills: 0 | Status: SHIPPED | OUTPATIENT
Start: 2024-08-21 | End: 2024-09-20

## 2024-08-21 ASSESSMENT — FIBROSIS 4 INDEX: FIB4 SCORE: 0.32

## 2024-08-21 NOTE — PROGRESS NOTES
"Subjective:     CC: ADHD    HPI:   Dhiraj here today with     Pt is here today for medication follow up and refill  PDMP reviewed  ADHD behaviors include: Forgetfulness, easy distraction, poor organization   Current medication: Adderall XR 20mg QD  Medication side effects: denies  Prev Psych eval: completed  Symptoms currently: improved on medication  Denies aberrant medication usage        ROS:  Gen: no fevers/chills, no changes in weight  Eyes: no changes in vision  ENT: no sore throat, no hearing loss, no bloody nose  Pulm: no sob, no cough  CV: no chest pain, no palpitations  GI: no nausea/vomiting, no diarrhea  : no dysuria  MSk: no myalgias  Skin: no rash  Neuro: no headaches, no numbness/tingling  Heme/Lymph: no easy bruising    Current Outpatient Medications Ordered in Epic   Medication Sig Dispense Refill    amphetamine-dextroamphetamine (ADDERALL XR) 20 MG per XR capsule Take 1 Capsule by mouth every morning for 30 days. Indications: Attention Deficit Hyperactivity Disorder 30 Capsule 0    [START ON 9/20/2024] amphetamine-dextroamphetamine (ADDERALL XR) 20 MG per XR capsule Take 1 Capsule by mouth every morning for 30 days. Indications: Attention Deficit Hyperactivity Disorder 30 Capsule 0    [START ON 10/20/2024] amphetamine-dextroamphetamine (ADDERALL XR) 20 MG per XR capsule Take 1 Capsule by mouth every morning for 30 days. Indications: Attention Deficit Hyperactivity Disorder 30 Capsule 0    Acetaminophen 325 MG Cap Take  by mouth. (Patient not taking: Reported on 3/12/2024)       No current [facility]-ordered facility-administered medications on file.       Objective:     Exam:  /60 (BP Location: Right arm, Patient Position: Sitting, BP Cuff Size: Adult)   Pulse 62   Temp 35.8 °C (96.5 °F) (Temporal)   Resp 18   Ht 1.753 m (5' 9\")   Wt 79 kg (174 lb 2.6 oz)   SpO2 96%   BMI 25.72 kg/m²  Body mass index is 25.72 kg/m².    Gen: Alert and oriented, No apparent distress.  Neck: Neck is supple " without lymphadenopathy.  Lungs: Normal effort, CTA bilaterally, no wheezes, rhonchi, or rales  CV: Regular rate and rhythm. No murmurs, rubs, or gallops.  Ext: No clubbing, cyanosis, edema.      Assessment & Plan:     29 y.o. male with the following -     Problem List Items Addressed This Visit       Attention deficit hyperactivity disorder (ADHD), combined type    Relevant Medications    amphetamine-dextroamphetamine (ADDERALL XR) 20 MG per XR capsule    amphetamine-dextroamphetamine (ADDERALL XR) 20 MG per XR capsule (Start on 9/20/2024)    amphetamine-dextroamphetamine (ADDERALL XR) 20 MG per XR capsule (Start on 10/20/2024)     Other Visit Diagnoses       Screening-pulmonary TB        Relevant Orders    Quantiferon Gold TB (PPD)        Patient understands this prescription is a controlled substance which is potentially habit-forming and its use is regulated by the [employer]. Refills are subject to terms of a controlled substance agreement and patient has an updated one on file. Most recent UDS is appropriate. Any refill requires an office visit. Narcotics may have adverse effects and the risks of addiction, accidental overdose and death were emphasized. Provided prescriptions for the next three months.      I spent a total of 12 minutes with record review, exam, communication with the patient, communication with other providers, and documentation of this encounter.      No follow-ups on file.    Please note that this dictation was created using voice recognition software. I have made every reasonable attempt to correct obvious errors, but there may be errors of grammar and possibly content that I did not discover before finalizing the note.

## 2025-01-31 ENCOUNTER — OFFICE VISIT (OUTPATIENT)
Dept: URGENT CARE | Facility: PHYSICIAN GROUP | Age: 30
End: 2025-01-31
Payer: COMMERCIAL

## 2025-01-31 VITALS
HEIGHT: 69 IN | TEMPERATURE: 96.7 F | OXYGEN SATURATION: 95 % | DIASTOLIC BLOOD PRESSURE: 74 MMHG | RESPIRATION RATE: 16 BRPM | SYSTOLIC BLOOD PRESSURE: 106 MMHG | BODY MASS INDEX: 25.77 KG/M2 | HEART RATE: 71 BPM | WEIGHT: 174 LBS

## 2025-01-31 DIAGNOSIS — H66.004 RECURRENT ACUTE SUPPURATIVE OTITIS MEDIA OF RIGHT EAR WITHOUT SPONTANEOUS RUPTURE OF TYMPANIC MEMBRANE: ICD-10-CM

## 2025-01-31 PROCEDURE — 3074F SYST BP LT 130 MM HG: CPT

## 2025-01-31 PROCEDURE — 3078F DIAST BP <80 MM HG: CPT

## 2025-01-31 PROCEDURE — 99213 OFFICE O/P EST LOW 20 MIN: CPT

## 2025-01-31 RX ORDER — DEXTROAMPHETAMINE SACCHARATE, AMPHETAMINE ASPARTATE, DEXTROAMPHETAMINE SULFATE AND AMPHETAMINE SULFATE 5; 5; 5; 5 MG/1; MG/1; MG/1; MG/1
20 TABLET ORAL 2 TIMES DAILY
COMMUNITY

## 2025-01-31 RX ORDER — CEFDINIR 300 MG/1
300 CAPSULE ORAL 2 TIMES DAILY
Qty: 14 CAPSULE | Refills: 0 | Status: SHIPPED | OUTPATIENT
Start: 2025-01-31 | End: 2025-02-07

## 2025-01-31 ASSESSMENT — FIBROSIS 4 INDEX: FIB4 SCORE: 0.32

## 2025-02-01 ASSESSMENT — ENCOUNTER SYMPTOMS
RHINORRHEA: 0
SHORTNESS OF BREATH: 0
SORE THROAT: 1
WEAKNESS: 0
FEVER: 0
HEADACHES: 0
NAUSEA: 0
NECK PAIN: 0
VOMITING: 0
COUGH: 0
DIARRHEA: 0
DIZZINESS: 0

## 2025-02-02 NOTE — PROGRESS NOTES
"Subjective     Dhiraj Vincent is a 29 y.o. male who presents with Otalgia ((R) ear pain, pain under base of tongue, hard to swallow, bilateral eye discharge, (R) side of throat pain X 1 week )            Otalgia   There is pain in the right ear. This is a new problem. The current episode started in the past 7 days. The problem has been unchanged. There has been no fever. Associated symptoms include a sore throat. Pertinent negatives include no coughing, diarrhea, ear discharge, headaches, neck pain, rash, rhinorrhea or vomiting. He has tried nothing for the symptoms.       Review of Systems   Constitutional:  Negative for fever and malaise/fatigue.   HENT:  Positive for ear pain and sore throat. Negative for congestion, ear discharge and rhinorrhea.    Respiratory:  Negative for cough and shortness of breath.    Cardiovascular:  Negative for chest pain.   Gastrointestinal:  Negative for diarrhea, nausea and vomiting.   Musculoskeletal:  Negative for neck pain.   Skin:  Negative for rash.   Neurological:  Negative for dizziness, weakness and headaches.   All other systems reviewed and are negative.             Objective     /74 (BP Location: Right arm, Patient Position: Sitting, BP Cuff Size: Adult)   Pulse 71   Temp 35.9 °C (96.7 °F) (Temporal)   Resp 16   Ht 1.753 m (5' 9\")   Wt 78.9 kg (174 lb)   SpO2 95%   BMI 25.70 kg/m²      Physical Exam  Vitals reviewed.   Constitutional:       Appearance: Normal appearance.   HENT:      Head: Normocephalic and atraumatic.      Right Ear: Ear canal normal. A middle ear effusion is present. Tympanic membrane is erythematous and bulging.      Left Ear: Tympanic membrane and ear canal normal.      Nose: Nose normal.      Mouth/Throat:      Mouth: Mucous membranes are moist.      Pharynx: Oropharynx is clear. No oropharyngeal exudate or posterior oropharyngeal erythema.   Eyes:      Conjunctiva/sclera: Conjunctivae normal.   Cardiovascular:      Rate and " Rhythm: Normal rate and regular rhythm.      Pulses: Normal pulses.      Heart sounds: Normal heart sounds.   Pulmonary:      Effort: Pulmonary effort is normal. No respiratory distress.      Breath sounds: Normal breath sounds. No wheezing or rhonchi.   Abdominal:      General: Abdomen is flat.      Palpations: Abdomen is soft.   Musculoskeletal:         General: Normal range of motion.   Skin:     General: Skin is warm and dry.   Neurological:      Mental Status: He is alert and oriented to person, place, and time.   Psychiatric:         Behavior: Behavior normal.         Judgment: Judgment normal.                             Assessment & Plan        Assessment & Plan  Recurrent acute suppurative otitis media of right ear without spontaneous rupture of tympanic membrane    Orders:    cefdinir (OMNICEF) 300 MG Cap; Take 1 Capsule by mouth 2 times a day for 7 days.    Dhiraj can continue supportive care that was discussed in clinic such as alternating ibuprofen and acetaminophen, rest, plenty of fluids such as water, Pedialyte, low sugar Gatorade, broth, hot steamy showers, hot tea with honey, cough drops/throat spray, and a cool-mist humidifier by bed at night.    Discussed ER precautions such as a fever greater than 101F that cannot be brought down by Tylenol or Advil, shortness of breath, or difficulty breathing.

## 2025-02-17 ENCOUNTER — OFFICE VISIT (OUTPATIENT)
Dept: URGENT CARE | Facility: PHYSICIAN GROUP | Age: 30
End: 2025-02-17
Payer: COMMERCIAL

## 2025-02-17 ENCOUNTER — APPOINTMENT (OUTPATIENT)
Dept: URGENT CARE | Facility: PHYSICIAN GROUP | Age: 30
End: 2025-02-17
Payer: COMMERCIAL

## 2025-02-17 VITALS
HEART RATE: 75 BPM | SYSTOLIC BLOOD PRESSURE: 108 MMHG | OXYGEN SATURATION: 96 % | DIASTOLIC BLOOD PRESSURE: 74 MMHG | HEIGHT: 69 IN | RESPIRATION RATE: 16 BRPM | WEIGHT: 178.57 LBS | TEMPERATURE: 97.7 F | BODY MASS INDEX: 26.45 KG/M2

## 2025-02-17 DIAGNOSIS — H69.91 DISORDER OF RIGHT EUSTACHIAN TUBE: ICD-10-CM

## 2025-02-17 DIAGNOSIS — H92.02 LEFT EAR PAIN: ICD-10-CM

## 2025-02-17 PROCEDURE — 3078F DIAST BP <80 MM HG: CPT | Performed by: STUDENT IN AN ORGANIZED HEALTH CARE EDUCATION/TRAINING PROGRAM

## 2025-02-17 PROCEDURE — 3074F SYST BP LT 130 MM HG: CPT | Performed by: STUDENT IN AN ORGANIZED HEALTH CARE EDUCATION/TRAINING PROGRAM

## 2025-02-17 PROCEDURE — 99212 OFFICE O/P EST SF 10 MIN: CPT | Performed by: STUDENT IN AN ORGANIZED HEALTH CARE EDUCATION/TRAINING PROGRAM

## 2025-02-17 ASSESSMENT — ENCOUNTER SYMPTOMS
FEVER: 0
COUGH: 0
CHILLS: 0

## 2025-02-17 ASSESSMENT — FIBROSIS 4 INDEX: FIB4 SCORE: 0.32

## 2025-02-18 NOTE — PROGRESS NOTES
Subjective:   Dhiraj Vincent is a 29 y.o. male who presents for Otalgia (R Ear infection 1/31, completed antibiotic course and pain had mostly resolved, but pain has now returned.), Eye Problem (L eye redness and discharge x 1 day), and Sinus Problem (Sinus pressure in face x 3 days)      HPI:  20 male presents with right ear pain.  He reports the pain started end of January.  He did complete a course of antibiotics and the pain had mostly resolved at that time but the pain quickly returned thereafter.  He reports a history of problems with that right ear especially with controlling the pressure and equalizing the pressure when he goes up to altitude.  He reports multiple histories of possible traumas to the ear may be ruptured eardrum in the past.  Episodes of BPPV as well as vertigo when clearing the pressure in his ear.  - He denies any fevers or chills at this time  - He does report a history of strange sensations after his wisdom teeth removed as well as a possibility of a crackling/popping sound whenever equalizes ears in the right side of his jaw.    No significant congestion at this time no significant runny nose no cough    Review of Systems   Constitutional:  Negative for chills and fever.   HENT:  Positive for ear pain. Negative for ear discharge.    Respiratory:  Negative for cough.        Medications:    Acetaminophen Caps  amphetamine-dextroamphetamine Tabs    Allergies: Penicillins and Sulfa drugs    Problem List: Dhiraj Vincent does not have any pertinent problems on file.    Surgical History:  Past Surgical History:   Procedure Laterality Date    CYST EXCISION      Left knee    HERNIA REPAIR Right        Past Social Hx: Dhiraj Vincent  reports that he has never smoked. He has never used smokeless tobacco. He reports that he does not drink alcohol and does not use drugs.     Past Family Hx:  Dhiraj Vincent family history includes Diabetes in his father and mother; Heart  "Attack in his father; Prostate cancer in his paternal grandfather.     Problem list, medications, and allergies reviewed by myself today in Epic.     Objective:     /74 (BP Location: Left arm, Patient Position: Sitting, BP Cuff Size: Adult long)   Pulse 75   Temp 36.5 °C (97.7 °F) (Temporal)   Resp 16   Ht 1.753 m (5' 9\")   Wt 81 kg (178 lb 9.2 oz)   SpO2 96%   BMI 26.37 kg/m²     Physical Exam  Constitutional:       Appearance: Normal appearance.   HENT:      Head: Normocephalic and atraumatic.      Right Ear: Tympanic membrane is bulging.      Left Ear: Tympanic membrane normal.      Nose: Nose normal. No congestion or rhinorrhea.      Mouth/Throat:      Pharynx: No oropharyngeal exudate or posterior oropharyngeal erythema.   Eyes:      Extraocular Movements: Extraocular movements intact.      Conjunctiva/sclera: Conjunctivae normal.   Cardiovascular:      Rate and Rhythm: Normal rate and regular rhythm.      Pulses: Normal pulses.      Heart sounds: Normal heart sounds.   Pulmonary:      Effort: Pulmonary effort is normal.      Breath sounds: Normal breath sounds.   Neurological:      Mental Status: He is alert.         Assessment/Plan:     Diagnosis and associated orders:     1. Left ear pain  Referral to ENT      2. Disorder of right eustachian tube  Referral to ENT         Comments/MDM:     1. Left ear pain  2. Disorder of right eustachian tube    Discussed with patient there is no evidence of acute bacterial illness of the right ear at this time though there was evidence of some pressure buildup behind it.  I recommend OTC decongestants as well as use of daily Flonase, daily antihistamine to help with eustachian tube dysfunction.  Also send a referral to ENT at this time given his ongoing problems with this right ear with \"multiple years with no significant resolution of symptoms.    - Referral to ENT           Differential diagnosis, natural history, supportive care, and indications for " immediate follow-up discussed.    Advised the patient to follow-up with the primary care physician for recheck, reevaluation, and consideration of further management.    Please note that this dictation was created using voice recognition software. I have made a reasonable attempt to correct obvious errors, but I expect that there are errors of grammar and possibly content that I did not discover before finalizing the note.    Zana Davis M.D.

## 2025-02-18 NOTE — Clinical Note
REFERRAL APPROVAL NOTICE         Sent on February 18, 2025                   Dhiraj Vincent  [address] [facility] [address]                   Dear Mr. Vincent,    After a careful review of the medical information and benefit coverage, [facility] has processed your referral. See below for additional details.    If applicable, you must be actively enrolled with your insurance for coverage of the authorized service. If you have any questions regarding your coverage, please contact your insurance directly.    REFERRAL INFORMATION   Referral #:  [id number]  Referred-To Provider    Referred-By Provider:  Otolaryngology    Zana Davis M.D.   [facility]      [address] [place] [address]  [phone] [address] [facility] [place] [address]  [phone]    Referral Start Date:  02/17/2025  Referral End Date:   02/17/2026             SCHEDULING  If you do not already have an appointment, please call [phone] to make an appointment.     MORE INFORMATION  If you do not already have a [employer] account, sign up at: [email or web address]  You can access your medical information, make appointments, see lab results, billing information, and more.  If you have questions regarding this referral, please contact  the [facility] Referrals department at:             [phone]. Monday - Friday 8:00AM - 5:00PM.     Sincerely,    [facility]

## 2025-03-03 ENCOUNTER — APPOINTMENT (OUTPATIENT)
Dept: MEDICAL GROUP | Facility: LAB | Age: 30
End: 2025-03-03
Payer: COMMERCIAL

## 2025-03-03 VITALS
SYSTOLIC BLOOD PRESSURE: 106 MMHG | WEIGHT: 178 LBS | TEMPERATURE: 97.5 F | BODY MASS INDEX: 26.36 KG/M2 | RESPIRATION RATE: 18 BRPM | HEIGHT: 69 IN | OXYGEN SATURATION: 97 % | DIASTOLIC BLOOD PRESSURE: 64 MMHG | HEART RATE: 68 BPM

## 2025-03-03 DIAGNOSIS — F90.2 ATTENTION DEFICIT HYPERACTIVITY DISORDER (ADHD), COMBINED TYPE: ICD-10-CM

## 2025-03-03 DIAGNOSIS — Z11.1 SCREENING-PULMONARY TB: ICD-10-CM

## 2025-03-03 DIAGNOSIS — Z13.220 LIPID SCREENING: ICD-10-CM

## 2025-03-03 DIAGNOSIS — Z13.29 SCREENING FOR THYROID DISORDER: ICD-10-CM

## 2025-03-03 PROCEDURE — 99213 OFFICE O/P EST LOW 20 MIN: CPT | Performed by: PHYSICIAN ASSISTANT

## 2025-03-03 PROCEDURE — 3078F DIAST BP <80 MM HG: CPT | Performed by: PHYSICIAN ASSISTANT

## 2025-03-03 PROCEDURE — 3074F SYST BP LT 130 MM HG: CPT | Performed by: PHYSICIAN ASSISTANT

## 2025-03-03 RX ORDER — DEXTROAMPHETAMINE SACCHARATE, AMPHETAMINE ASPARTATE MONOHYDRATE, DEXTROAMPHETAMINE SULFATE AND AMPHETAMINE SULFATE 5; 5; 5; 5 MG/1; MG/1; MG/1; MG/1
20 CAPSULE, EXTENDED RELEASE ORAL EVERY MORNING
Qty: 30 CAPSULE | Refills: 0 | Status: SHIPPED | OUTPATIENT
Start: 2025-05-02 | End: 2025-06-01

## 2025-03-03 RX ORDER — DEXTROAMPHETAMINE SACCHARATE, AMPHETAMINE ASPARTATE MONOHYDRATE, DEXTROAMPHETAMINE SULFATE AND AMPHETAMINE SULFATE 5; 5; 5; 5 MG/1; MG/1; MG/1; MG/1
20 CAPSULE, EXTENDED RELEASE ORAL EVERY MORNING
Qty: 30 CAPSULE | Refills: 0 | Status: SHIPPED | OUTPATIENT
Start: 2025-03-03 | End: 2025-04-02

## 2025-03-03 RX ORDER — DEXTROAMPHETAMINE SACCHARATE, AMPHETAMINE ASPARTATE MONOHYDRATE, DEXTROAMPHETAMINE SULFATE AND AMPHETAMINE SULFATE 5; 5; 5; 5 MG/1; MG/1; MG/1; MG/1
20 CAPSULE, EXTENDED RELEASE ORAL EVERY MORNING
Qty: 30 CAPSULE | Refills: 0 | Status: SHIPPED | OUTPATIENT
Start: 2025-04-02 | End: 2025-05-02

## 2025-03-03 ASSESSMENT — FIBROSIS 4 INDEX: FIB4 SCORE: 0.32

## 2025-03-03 ASSESSMENT — PATIENT HEALTH QUESTIONNAIRE - PHQ9: CLINICAL INTERPRETATION OF PHQ2 SCORE: 0

## 2025-03-03 NOTE — PROGRESS NOTES
"Subjective:     CC: Med refill    HPI:   Dhiraj here today with     Pt is here today for medication follow up and refill  PDMP reviewed  ADHD behaviors include: Forgetfulness, easy distraction, poor organization   Current medication: adderall XR 20mg QD  Medication side effects: denies  Prev Psych eval: completed  Symptoms currently: improved on medication  Denies aberrant medication usage        ROS:  Gen: no fevers/chills, no changes in weight  Eyes: no changes in vision  ENT: no sore throat, no hearing loss, no bloody nose  Pulm: no sob, no cough  CV: no chest pain, no palpitations  GI: no nausea/vomiting, no diarrhea  : no dysuria  MSk: no myalgias  Skin: no rash  Neuro: no headaches, no numbness/tingling  Heme/Lymph: no easy bruising    Current Outpatient Medications Ordered in Epic   Medication Sig Dispense Refill    amphetamine-dextroamphetamine (ADDERALL XR) 20 MG per XR capsule Take 1 Capsule by mouth every morning for 30 days. Indications: Attention Deficit Hyperactivity Disorder 30 Capsule 0    [START ON 4/2/2025] amphetamine-dextroamphetamine (ADDERALL XR) 20 MG per XR capsule Take 1 Capsule by mouth every morning for 30 days. Indications: Attention Deficit Hyperactivity Disorder 30 Capsule 0    [START ON 5/2/2025] amphetamine-dextroamphetamine (ADDERALL XR) 20 MG per XR capsule Take 1 Capsule by mouth every morning for 30 days. Indications: Attention Deficit Hyperactivity Disorder 30 Capsule 0    amphetamine-dextroamphetamine (ADDERALL) 20 MG Tab Take 20 mg by mouth 2 times a day.      Acetaminophen 325 MG Cap Take  by mouth.       No current Epic-ordered facility-administered medications on file.       Objective:     Exam:  /64 (BP Location: Right arm, Patient Position: Sitting, BP Cuff Size: Adult)   Pulse 68   Temp 36.4 °C (97.5 °F) (Temporal)   Resp 18   Ht 1.753 m (5' 9\")   Wt 80.7 kg (178 lb)   SpO2 97%   BMI 26.29 kg/m²  Body mass index is 26.29 kg/m².    Gen: Alert and oriented, " No apparent distress.  Neck: Neck is supple without lymphadenopathy.  Lungs: Normal effort, CTA bilaterally, no wheezes, rhonchi, or rales  CV: Regular rate and rhythm. No murmurs, rubs, or gallops.  Ext: No clubbing, cyanosis, edema.      Assessment & Plan:     29 y.o. male with the following -     Problem List Items Addressed This Visit       Attention deficit hyperactivity disorder (ADHD), combined type    Relevant Medications    amphetamine-dextroamphetamine (ADDERALL XR) 20 MG per XR capsule    amphetamine-dextroamphetamine (ADDERALL XR) 20 MG per XR capsule (Start on 4/2/2025)    amphetamine-dextroamphetamine (ADDERALL XR) 20 MG per XR capsule (Start on 5/2/2025)    Other Relevant Orders    URINE DRUG SCREEN     Other Visit Diagnoses         Screening for thyroid disorder        Relevant Orders    CBC WITH DIFFERENTIAL    Comp Metabolic Panel    Lipid Profile      Lipid screening        Relevant Orders    TSH WITH REFLEX TO FT4      Screening-pulmonary TB        Relevant Orders    Quantiferon Gold TB (PPD)        Patient understands this prescription is a controlled substance which is potentially habit-forming and its use is regulated by the [employer].  Refills are subject to terms of a controlled substance agreement and patient has an updated one on file. Most recent UDS is appropriate. Any refill requires an office visit. Narcotics may have adverse effects and the risks of addiction, accidental overdose and death were emphasized. Provided prescriptions for the next three months.  Due for updated UDS by next appt      I spent a total of 11 minutes with record review, exam, communication with the patient, communication with other providers, and documentation of this encounter.      No follow-ups on file.    Please note that this dictation was created using voice recognition software. I have made every reasonable attempt to correct obvious errors, but there may be errors of grammar and possibly content that I did not  discover before finalizing the note.

## 2025-04-08 ENCOUNTER — APPOINTMENT (OUTPATIENT)
Dept: MEDICAL GROUP | Facility: LAB | Age: 30
End: 2025-04-08
Payer: COMMERCIAL

## 2025-04-11 ENCOUNTER — OFFICE VISIT (OUTPATIENT)
Dept: MEDICAL GROUP | Facility: LAB | Age: 30
End: 2025-04-11
Payer: COMMERCIAL

## 2025-04-11 ENCOUNTER — HOSPITAL ENCOUNTER (OUTPATIENT)
Dept: LAB | Facility: MEDICAL CENTER | Age: 30
End: 2025-04-11
Attending: PHYSICIAN ASSISTANT
Payer: COMMERCIAL

## 2025-04-11 VITALS
WEIGHT: 176.59 LBS | HEART RATE: 61 BPM | OXYGEN SATURATION: 96 % | DIASTOLIC BLOOD PRESSURE: 62 MMHG | SYSTOLIC BLOOD PRESSURE: 120 MMHG | RESPIRATION RATE: 16 BRPM | TEMPERATURE: 96.9 F | BODY MASS INDEX: 26.16 KG/M2 | HEIGHT: 69 IN

## 2025-04-11 DIAGNOSIS — E04.1 THYROID NODULE: ICD-10-CM

## 2025-04-11 LAB
T3FREE SERPL-MCNC: 3.71 PG/ML (ref 2–4.4)
T4 FREE SERPL-MCNC: 1.46 NG/DL (ref 0.93–1.7)
THYROPEROXIDASE AB SERPL-ACNC: 12.7 IU/ML (ref 0–9)
TSH SERPL-ACNC: 1.25 UIU/ML (ref 0.38–5.33)

## 2025-04-11 PROCEDURE — 84443 ASSAY THYROID STIM HORMONE: CPT

## 2025-04-11 PROCEDURE — 86800 THYROGLOBULIN ANTIBODY: CPT

## 2025-04-11 PROCEDURE — 36415 COLL VENOUS BLD VENIPUNCTURE: CPT

## 2025-04-11 PROCEDURE — 3078F DIAST BP <80 MM HG: CPT | Performed by: PHYSICIAN ASSISTANT

## 2025-04-11 PROCEDURE — 99214 OFFICE O/P EST MOD 30 MIN: CPT | Performed by: PHYSICIAN ASSISTANT

## 2025-04-11 PROCEDURE — 84481 FREE ASSAY (FT-3): CPT

## 2025-04-11 PROCEDURE — 86376 MICROSOMAL ANTIBODY EACH: CPT

## 2025-04-11 PROCEDURE — 3074F SYST BP LT 130 MM HG: CPT | Performed by: PHYSICIAN ASSISTANT

## 2025-04-11 PROCEDURE — 84439 ASSAY OF FREE THYROXINE: CPT

## 2025-04-11 ASSESSMENT — FIBROSIS 4 INDEX: FIB4 SCORE: 0.32

## 2025-04-11 NOTE — PROGRESS NOTES
Subjective:     CC: Thyroid nodule    HPI:   Dhiraj here today with   Verbal consent was acquired by the patient to use [employer] ambient listening note generation during this visit Yes     History of Present Illness  The patient presents for evaluation of a goiter.    Thyroid nodule  - The patient reports a palpable mass posterior to their Lsternocleidomastoid muscle, previously evaluated through imaging and biopsy in 2019, which was benign.  - Recently, they observed an increase in size, making it more visible and palpable.  - They describe throat pressure when applying force to the area and difficulty swallowing, feeling obstructed, but no voice changes.  - They are concerned about potential surgery and lifelong thyroid hormone replacement therapy.    Weight Gain  - They have experienced weight gain since the last evaluation, attributed to lifestyle changes.    Fatigue  - They have experienced unusual fatigue over the past year, coinciding with the birth of their child.    Supplemental information: Their father had a thyroidectomy due to a positive biopsy for cancer, but no malignancy was found in the thyroid tissue.           ROS:  Gen: no fevers/chills, no changes in weight  Eyes: no changes in vision  ENT: no sore throat, no hearing loss, no bloody nose  Pulm: no sob, no cough  CV: no chest pain, no palpitations  GI: no nausea/vomiting, no diarrhea  : no dysuria  MSk: no myalgias  Skin: no rash  Neuro: no headaches, no numbness/tingling  Heme/Lymph: no easy bruising    Current Outpatient Medications Ordered in Epic   Medication Sig Dispense Refill    amphetamine-dextroamphetamine (ADDERALL XR) 20 MG per XR capsule Take 1 Capsule by mouth every morning for 30 days. Indications: Attention Deficit Hyperactivity Disorder 30 Capsule 0    [START ON 5/2/2025] amphetamine-dextroamphetamine (ADDERALL XR) 20 MG per XR capsule Take 1 Capsule by mouth every morning for 30 days. Indications: Attention Deficit  "Hyperactivity Disorder 30 Capsule 0    amphetamine-dextroamphetamine (ADDERALL) 20 MG Tab Take 20 mg by mouth 2 times a day.      Acetaminophen 325 MG Cap Take  by mouth.       No current Epic-ordered facility-administered medications on file.       Objective:     Exam:  /62 (BP Location: Left arm, Patient Position: Sitting, BP Cuff Size: Adult)   Pulse 61   Temp 36.1 °C (96.9 °F) (Temporal)   Resp 16   Ht 1.753 m (5' 9\")   Wt 80.1 kg (176 lb 9.4 oz)   SpO2 96%   BMI 26.08 kg/m²  Body mass index is 26.08 kg/m².    Gen: Alert and oriented, No apparent distress.  Neck: Neck is supple without lymphadenopathy.  Lungs: Normal effort, CTA bilaterally, no wheezes, rhonchi, or rales  CV: Regular rate and rhythm. No murmurs, rubs, or gallops.  Ext: No clubbing, cyanosis, edema.      Assessment & Plan:     29 y.o. male with the following -     Problem List Items Addressed This Visit       Thyroid nodule    Relevant Orders    TSH    T3 FREE    FREE THYROXINE    THYROID PEROXIDASE  (TPO) AB    ANTITHYROGLOBULIN AB    US-THYROID     Assessment & Plan    History of thyroid nodule with benign biopsy in 2019. Recently, nodule has become more prominent and palpable, causing throat pressure and slight difficulty swallowing. No pain or voice change.  Diagnostic plan: Ordered thyroid ultrasound, thyroid function tests, and thyroid antibodies. If ultrasound reveals a suspicious nodule, rebiopsy will be considered.  Treatment plan: Referral to endocrinologist for further treatment discussion.      PROCEDURE  2019 goiter biopsy was benign.      I spent a total of 12 minutes with record review, exam, communication with the patient, communication with other providers, and documentation of this encounter.      No follow-ups on file.    Please note that this dictation was created using voice recognition software. I have made every reasonable attempt to correct obvious errors, but there may be errors of grammar and possibly " content that I did not discover before finalizing the note.

## 2025-04-13 LAB — THYROGLOB AB SERPL-ACNC: <1.5 IU/ML (ref 0–4)

## 2025-04-14 ENCOUNTER — RESULTS FOLLOW-UP (OUTPATIENT)
Dept: MEDICAL GROUP | Facility: LAB | Age: 30
End: 2025-04-14

## 2025-04-27 ENCOUNTER — OFFICE VISIT (OUTPATIENT)
Dept: URGENT CARE | Facility: PHYSICIAN GROUP | Age: 30
End: 2025-04-27
Payer: COMMERCIAL

## 2025-04-27 VITALS
HEART RATE: 58 BPM | TEMPERATURE: 98.1 F | DIASTOLIC BLOOD PRESSURE: 64 MMHG | RESPIRATION RATE: 16 BRPM | OXYGEN SATURATION: 98 % | WEIGHT: 174.16 LBS | BODY MASS INDEX: 25.8 KG/M2 | HEIGHT: 69 IN | SYSTOLIC BLOOD PRESSURE: 112 MMHG

## 2025-04-27 DIAGNOSIS — H00.015 HORDEOLUM EXTERNUM OF LEFT LOWER EYELID: ICD-10-CM

## 2025-04-27 PROCEDURE — 3074F SYST BP LT 130 MM HG: CPT

## 2025-04-27 PROCEDURE — 99213 OFFICE O/P EST LOW 20 MIN: CPT

## 2025-04-27 PROCEDURE — 3078F DIAST BP <80 MM HG: CPT

## 2025-04-27 RX ORDER — ERYTHROMYCIN 5 MG/G
OINTMENT OPHTHALMIC
Qty: 3.5 G | Refills: 0 | Status: SHIPPED | OUTPATIENT
Start: 2025-04-27

## 2025-04-30 ASSESSMENT — ENCOUNTER SYMPTOMS
SORE THROAT: 0
DOUBLE VISION: 0
COUGH: 0
HEADACHES: 0
FEVER: 0
FOREIGN BODY SENSATION: 0
CHILLS: 0
SHORTNESS OF BREATH: 0
EYE DISCHARGE: 0
EYE ITCHING: 1
VOMITING: 0
ABDOMINAL PAIN: 0
EYE REDNESS: 1
DIARRHEA: 0
MYALGIAS: 0
PHOTOPHOBIA: 0
BLURRED VISION: 0
NAUSEA: 0
EYE PAIN: 0

## 2025-04-30 NOTE — PROGRESS NOTES
"Subjective:   Dhiraj Vincent is a 29 y.o. male who presents for Stye (X 2 weeks on (L) eye and pain was worse yesterday)      Eye Problem   The left eye is affected. This is a new problem. The current episode started 1 to 4 weeks ago. The problem has been gradually worsening. There was no injury mechanism. There is No known exposure to pink eye. He Does not wear contacts. Associated symptoms include eye redness and itching. Pertinent negatives include no blurred vision, eye discharge, double vision, fever, foreign body sensation, nausea, photophobia, recent URI or vomiting. He has tried eye drops for the symptoms. The treatment provided no relief.       Review of Systems   Constitutional:  Negative for chills, fever and malaise/fatigue.   HENT:  Negative for congestion, ear pain, hearing loss and sore throat.    Eyes:  Positive for redness and itching. Negative for blurred vision, double vision, photophobia, pain and discharge.   Respiratory:  Negative for cough and shortness of breath.    Cardiovascular:  Negative for chest pain.   Gastrointestinal:  Negative for abdominal pain, diarrhea, nausea and vomiting.   Genitourinary:  Negative for dysuria.   Musculoskeletal:  Negative for myalgias.   Skin:  Negative for rash.   Neurological:  Negative for headaches.       Medications, Allergies, and current problem list reviewed today in Epic.     Objective:     /64 (BP Location: Left arm, Patient Position: Sitting, BP Cuff Size: Adult long)   Pulse (!) 58   Temp 36.7 °C (98.1 °F) (Temporal)   Resp 16   Ht 1.753 m (5' 9\")   Wt 79 kg (174 lb 2.6 oz)   SpO2 98%     Physical Exam  Vitals and nursing note reviewed.   Constitutional:       General: He is not in acute distress.     Appearance: Normal appearance. He is not ill-appearing.   HENT:      Head: Normocephalic and atraumatic.      Right Ear: Tympanic membrane normal.      Left Ear: Tympanic membrane normal.      Nose: Nose normal.      Mouth/Throat: "      Mouth: Mucous membranes are moist.      Pharynx: Oropharynx is clear.   Eyes:      General: Lids are everted, no foreign bodies appreciated.         Left eye: Hordeolum (Left lower eyelid) present.No foreign body or discharge.      Conjunctiva/sclera: Conjunctivae normal.      Pupils: Pupils are equal, round, and reactive to light.   Cardiovascular:      Rate and Rhythm: Normal rate.      Heart sounds: Normal heart sounds.   Pulmonary:      Effort: Pulmonary effort is normal.      Breath sounds: Normal breath sounds.   Abdominal:      General: Abdomen is flat.      Palpations: Abdomen is soft.   Skin:     General: Skin is warm and dry.      Capillary Refill: Capillary refill takes less than 2 seconds.   Neurological:      Mental Status: He is alert and oriented to person, place, and time.   Psychiatric:         Mood and Affect: Mood normal.         Behavior: Behavior normal.         Assessment/Plan:       1. Hordeolum externum of left lower eyelid  erythromycin 5 MG/GM Ointment        After assessment patient here with persistent stye to left lower eye that is caused irritation to left eye.  Patient has been using stye eyedrops with no relief of symptoms.  At this time patient was instructed to start use of warm compresses to the area.  Patient was also provided erythromycin ointment and instructed to take as prescribed.  Patient instructed to monitor for any worsening signs and symptoms and if any other concerns patient was instructed return to urgent care for reevaluation.    Differential diagnosis, natural history, and supportive care discussed. We also reviewed side effects of medication including allergic response, GI upset, tendon injury, rash, sedation etc. Patient and/or guardian voices understanding.      Advised the patient to follow-up with the primary care physician for recheck, reevaluation, and consideration of further management.    I personally reviewed prior external notes and test results  pertinent to today's visit as well as additional imaging and testing completed in clinic today.     Please note that this dictation was created using voice recognition software. I have made every reasonable attempt to correct obvious errors, but I expect that there are errors of grammar and possibly content that I did not discover before finalizing the note.    This note was electronically signed by JEAN-PIERRE Franklin

## 2025-05-14 ENCOUNTER — RESULTS FOLLOW-UP (OUTPATIENT)
Dept: MEDICAL GROUP | Facility: LAB | Age: 30
End: 2025-05-14

## 2025-05-14 ENCOUNTER — HOSPITAL ENCOUNTER (OUTPATIENT)
Dept: RADIOLOGY | Facility: MEDICAL CENTER | Age: 30
End: 2025-05-14
Attending: PHYSICIAN ASSISTANT
Payer: COMMERCIAL

## 2025-05-14 DIAGNOSIS — E04.1 THYROID NODULE: ICD-10-CM

## 2025-05-14 DIAGNOSIS — E04.1 THYROID NODULE: Primary | ICD-10-CM

## 2025-05-14 PROCEDURE — 76536 US EXAM OF HEAD AND NECK: CPT

## 2025-06-11 ENCOUNTER — HOSPITAL ENCOUNTER (OUTPATIENT)
Dept: RADIOLOGY | Facility: MEDICAL CENTER | Age: 30
End: 2025-06-11
Attending: PHYSICIAN ASSISTANT
Payer: COMMERCIAL

## 2025-06-11 DIAGNOSIS — E04.1 THYROID NODULE: ICD-10-CM

## 2025-06-11 LAB — CYTOLOGY REG CYTOL: NORMAL

## 2025-06-11 PROCEDURE — 88173 CYTOPATH EVAL FNA REPORT: CPT | Mod: 26 | Performed by: PATHOLOGY

## 2025-06-11 PROCEDURE — 88173 CYTOPATH EVAL FNA REPORT: CPT | Performed by: PATHOLOGY

## 2025-06-11 PROCEDURE — 10006 FNA BX W/US GDN EA ADDL: CPT

## 2025-06-11 PROCEDURE — 32555 ASPIRATE PLEURA W/ IMAGING: CPT

## 2025-06-11 NOTE — PROGRESS NOTES
US guided bilateral thyroid nodule fine needle aspiration done by ADI Devries; NON-SEDATION (no H&P required as this is a NON SEDATION procedure) left and right anterior aspect of neck access site, dressing CDI; x3 passes in 1 jar of cytolyt obtained, x2 passes in 1 vial afirma obtained inn each nodule and sent to lab. Pt tolerated the procedure well. Pt hemodynamically stable pre/intra/post procedure; all questions and concerns answered prior to being d/c; patient provided with appropriate education for procedure; pt d/c home.

## 2025-06-16 ENCOUNTER — RESULTS FOLLOW-UP (OUTPATIENT)
Dept: MEDICAL GROUP | Facility: LAB | Age: 30
End: 2025-06-16
Payer: COMMERCIAL

## 2025-06-16 DIAGNOSIS — E04.1 THYROID NODULE: Primary | ICD-10-CM

## 2025-07-09 NOTE — PROGRESS NOTES
[facility] HEART CARE   CARDIOLOGY PROGRESS NOTE    Charleen Huddleston   [address] [facility] [id number]    Elisa Doherty     Chief Complaint   Patient presents with    Yearly Exam     Cardiac Care visit     Hypertension     Essential         HPI:   Ms. Huddleston is a 66 year old female who presents for annual cardiology follow-up. She has followed cardiology with HTN, sinus tachycardia and identified hyperkinetic heart function with grade I diastolic dysfunction. She has a history of hypertension, hyperlipidemia, prediabetes, SLE, ongoing tobacco abuse, elevated liver enzymes, hepatic steatosis and alcohol-related thrombocytopenia.  Patient does have a family history of structural heart disease, mitral disease in her father and sister.    Patient reported symptoms of racing heart back in March 2021.  She noted the racing heart with a heart rate up to 150s on her Fitbit.  She did not describe any chest pain or pressure at that time, no increased dyspnea.  She had also been identified to be hypertensive, she had been on losartan-hydrochlorothiazide 50-12.5 mg daily.    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 Her FibroScan was completed on 5/13/2021 revealing a F4 fibrosis. No hepatoma on recent US. She is working on weight loss and regular exercise, Mediterranean diet. She has also been abstinent of ETOH use.     Echocardiogram in November 2013 revealing normal LV size and function, estimated EF at 65% without regional wall motion abnormalities.  Normal RV size and function.  Mild LA enlargement.  Borderline LVH.  No significant valvular heart disease identified.  The aortic and mitral valves appeared structurally normal.    Echocardiogram on 4/16/2021 revealing normal global and regional LV function, LVEF 60 to 65%.  Borderline concentric wall thickening of the LV.  No regional wall motion abnormalities.  Normal RV size and function.  Both atria appeared normal  Subjective:   Dhiraj Vincent is a 28 y.o. male who presents for Sore Throat (X1day headache, sneezing)      HPI:    Patient presents to urgent care with concerns of rhinorrhea, nasal congestion, headache, sore throat, cough.    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 He needs to know if he is positive for covid, flu, strep.   Denies known sick contacts  He reports he has been sneezing and has a headache.       ROS As above in HPI    Medications:    Current Outpatient Medications on File Prior to Visit   Medication Sig Dispense Refill    amphetamine-dextroamphetamine (ADDERALL XR, 20MG,) 20 MG per XR capsule Take 1 Capsule by mouth every morning for 30 days. Indications: Attention Deficit Hyperactivity Disorder 30 Capsule 0    [START ON 10/12/2023] amphetamine-dextroamphetamine (ADDERALL XR, 20MG,) 20 MG per XR capsule Take 1 Capsule by mouth every morning for 30 days. Indications: Attention Deficit Hyperactivity Disorder 30 Capsule 0    [START ON 11/11/2023] amphetamine-dextroamphetamine (ADDERALL XR, 20MG,) 20 MG per XR capsule Take 1 Capsule by mouth every morning for 30 days. Indications: Attention Deficit Hyperactivity Disorder 30 Capsule 0     No current facility-administered medications on file prior to visit.        Allergies:   Penicillins and Sulfa drugs    Problem List:   Patient Active Problem List   Diagnosis    Attention deficit hyperactivity disorder (ADHD), combined type    Thyroid nodule        Surgical History:  Past Surgical History:   Procedure Laterality Date    CYST EXCISION      Left knee    HERNIA REPAIR Right        Past Social Hx:   Social History     Tobacco Use    Smoking status: Never    Smokeless tobacco: Never   Vaping Use    Vaping Use: Never used   Substance Use Topics    Alcohol use: Never    Drug use: Never          Problem list,  "medications, and allergies reviewed by myself today in Epic.     Objective:     /68 (BP Location: Left arm, Patient Position: Sitting, BP Cuff Size: Adult)   Pulse 64   Temp 36.1 °C (96.9 °F) (Temporal)   Resp 18   Ht 1.753 m (5' 9\")   Wt 74.8 kg (165 lb)   SpO2 98%   BMI 24.37 kg/m²     Physical Exam  Vitals and nursing note reviewed.   Constitutional:       General: He is not in acute distress.     Appearance: Normal appearance. He is not ill-appearing or diaphoretic.   HENT:      Head: Normocephalic.      Right Ear: Tympanic membrane and ear canal normal.      Left Ear: Tympanic membrane and ear canal normal.      Nose: Congestion and rhinorrhea present.      Mouth/Throat:      Mouth: Mucous membranes are moist.      Pharynx: Oropharynx is clear. Posterior oropharyngeal erythema present.   Cardiovascular:      Rate and Rhythm: Normal rate and regular rhythm.      Heart sounds: Normal heart sounds. No murmur heard.     No friction rub. No gallop.   Pulmonary:      Effort: Pulmonary effort is normal. No respiratory distress.      Breath sounds: Normal breath sounds. No stridor. No wheezing, rhonchi or rales.   Chest:      Chest wall: No tenderness.   Abdominal:      General: Abdomen is flat. Bowel sounds are normal.      Palpations: Abdomen is soft.   Skin:     General: Skin is warm and dry.      Capillary Refill: Capillary refill takes less than 2 seconds.      Findings: No rash.   Neurological:      Mental Status: He is alert and oriented to person, place, and time.         Assessment/Plan:       Results for orders placed or performed in visit on 09/28/23   POCT CEPHEID COV-2, FLU A/B, RSV - PCR   Result Value Ref Range    SARS-CoV-2 by PCR Negative Negative, Invalid    Influenza virus A RNA Negative Negative, Invalid    Influenza virus B, PCR Negative Negative, Invalid    RSV, PCR Negative Negative, Invalid   POCT CEPHEID GROUP A STREP - PCR   Result Value Ref Range    POC Group A Strep, PCR Not " with atrial septum intact.  The mitral valve is normal in structure and function.  Mild aortic valve calcification with a mean aortic valve pressure gradient of 10 mmHg and a calculated JANI 1.6 cm , RVSP 22.8 mmHg plus RAP, normal pulmonary artery systolic pressure.  No pericardial effusion.    Exercise stress test was ordered secondary to hypertension- uncontrolled and racing heart.  This was performed as an exercise stress echocardiogram on 5/4/2021.  This was a submaximal, likely low risk exercise stress echocardiogram.  Patient only achieved 80% of the maximum predicted heart rate.  Normal LV size and function at baseline.  Mild LVH.  The LVEF was 55 to 60% at rest and increased appropriately to greater than 70% after exercise with appropriate decrease in LV cavity size.  Normal heart rate response to exercise.  Hypertensive response to exercise.  No anginal symptoms were endorsed during the study.  Doppler exam with no significant valvular abnormalities.  Her resting ECG revealed nonspecific ST and T wave changes.  Tracings throughout exercise without ectopy of significance.  There was some slight ST depression at almost 1 mm in the inferior and lateral leads with peak exercise.  In the recovery phase, no ectopy and continued ST changes were seen.  No evidence of exercise-induced dysrhythmia.  Inconclusive EKG portion of the stress test for exercise-induced myocardial ischemia.    ZIO monitor ordered for racing heart palpitations (4/20-5/2/21) revealing underlying sinus rhythm.  Average heart rate 76 bpm.  The heart rate ranged from 53 to 116 bpm.  No patient triggered events.  3 nonsustained runs of SVT occurred with the longest lasting 5 beats.  No VT, AF/AFL, symptomatic bradycardia, pauses, second-degree Mobitz type II or third-degree AV block.  Rare SVE and VE, less than 1%.  Ventricular bigeminy was present lasting up to 5.1 seconds.    NM Lexiscan stress test in July 2024 without evidence of inducible  Detected Not Detected, Invalid       Diagnosis and associated orders:   1. Pharyngitis, unspecified etiology  - POCT CEPHEID COV-2, FLU A/B, RSV - PCR  - POCT CEPHEID GROUP A STREP - PCR        Comments/MDM:     Negative poc covid, influenza, rsv, strep  Supportive measures encouraged: Rest, increased oral hydration, NSAIDs/tylenol as needed per package instructions, warm humidification, otc antihistamines, Flonase as needed   Follow up with PCP      Please note that this dictation was created using voice recognition software. I have made a reasonable attempt to correct obvious errors, but I expect that there are errors of grammar and possibly content that I did not discover before finalizing the note.    This note was electronically signed by JENAE Redmond     ischemia, normal heart function.    INTERVAL HISTORY:  Today, patient reports feeling good. No chest pain or pressure. No increased dyspnea. No edema. She remains active and feeling good with normal activity tolerance. No specific concerns today. Admits that her sister was recently diagnosed with atrial fibrillation.     RELEVANT TESTING:  Echocardiogram 7/15/2024  Interpretation Summary  Global and regional left ventricular function is hyperkinetic with an EF >70%.  Right ventricular function, chamber size, wall motion, and thickness are  normal.  Pulmonary artery systolic pressure is normal.  The inferior vena cava is normal.  No pericardial effusion is present.  No significant changes noted.    NM Lexiscan stress 7/11/2024    The nuclear stress test is negative for inducible myocardial ischemia   or infarction.     Left ventricular function is normal.     The left ventricular ejection fraction at rest is 70%.  The left   ventricular ejection fraction at stress is 85%.     There is no prior study for comparison. Lexiscan and Cardiolite injected without difficulty   Patient was asymptomatic throughout the test.   She developed inferior ST-T changes which persisted.   Vital signs remained stable.   Interpretation: Completed Lexiscan Cardiolite stress test.  See separate   report above regarding imaging for ischemia.   Signed, Josiah Yo MD, FAAP, FACP     Echocardiogram 5/23/22  Interpretation Summary  Global and regional left ventricular function is hyperkinetic with an EF of  65-70%.  Right ventricular function, chamber size, wall motion, and thickness are  normal.  Pulmonary artery systolic pressure is normal.  The inferior vena cava is normal.  No pericardial effusion is present.  No significant changes noted.      PAST MEDICAL HISTORY:   Past Medical History:   Diagnosis Date    Bacterial pneumonia 02/02/2010    Bitten by dog 09/2012    hospitalized    Chronic sinusitis     Chronic Sinusitis    Nonrheumatic  mitral valve insufficiency 02/2005    Trace Mitral Regurgitation, Echo.  Does not require SBE    Other shoulder lesions, left shoulder     L shoulder RTC tendonitis    Personal history of other medical treatment (CODE)     prophylaxis    Personal history of other medical treatment (CODE) 03/11/2005    Echocardiogram 3/11/05 for family history of mitral valve and aortic valve replacement.  Trace mitral regurgitation.  Mitral valve appeared normal.  Tricuspid aortic valve without AI or AS.  No focal wall motion abnormalities.  Ejection fraction 72% 3/12/05.    Primary osteoarthritis of right knee     No Comments Provided    Tobacco use     quit 9/2012          FAMILY HISTORY:   Family History   Problem Relation Age of Onset    Hyperlipidemia Mother         Hyperlipidemia,Hyperlipidemia    Other - See Comments Mother         Stroke,CVA 03/07    Heart Disease Mother         Heart Disease,aortic and mitral valve replacements, rheumatic fever    Mitral Valve Replacement Mother     Diabetes Father         Diabetes    Mitral Valve Replacement Father     Rheumatic fever Father     Diabetes Sister     Polycystic ovary syndrome Child     Thyroid Disease Child     Heart Disease Sister         Heart Disease,Valvular heart disease and diffuse athersclerosis on brain MRI    Other - See Comments Sister         rheumatic fever    Rheumatic fever Sister     Cerebrovascular Disease Sister     Other - See Comments Maternal Grandmother         Stroke,CVA    Prostate Cancer Paternal Grandfather         Cancer-prostate    Hypertension Sister     Hyperlipidemia Sister           PAST SURGICAL HISTORY:   Past Surgical History:   Procedure Laterality Date    ARTHROSCOPY KNEE Right 08/2001    Dr. Larios, meniscus tear, probable chondromalacia medial femoral condyle.    ARTHROSCOPY SHOULDER  2012 2011 / 2012,Left, supraspinatus repair    COLONOSCOPY  01/10/2013    hyperplastic, 1/10/23    COLONOSCOPY N/A 07/11/2023    1 small tubular adenoma,  follow up 33    ECHOCARDIOGRAM INTRAOPERATIVE IN OR  2005    for family history of mitral valve and aortic valve replacement.  Trace mitral regurgitation.  Mitral valve appeared normal.  Tricuspid aortic valve without AI or AS.  No focal wall motion abnormalities.  Ejection fraction 72%    OTHER SURGICAL HISTORY  2006    PREMALIG/BENIGN SKIN LESION EXCISION,Excision of a benign verrucose keratosis with actinic damage and lentiginous change.    OTHER SURGICAL HISTORY  2006    WA SODIUM HYALONURATE PER INJECTION,Synvisc injection    RELEASE TRIGGER FINGER N/A 2022    Procedure: Right Long (Middle) Trigger Finger Release;  Surgeon: Flako Reno MD;  Location: [facility] OR    TONSILLECTOMY      No Comments Provided    TOTAL KNEE ARTHROPLASTY Right           SOCIAL HISTORY:   Social History     Socioeconomic History    Marital status:      Spouse name: None    Number of children: None    Years of education: None    Highest education level: None   Occupational History    None   Social Needs    Financial resource strain: None    Food insecurity     Worry: None     Inability: None    Transportation needs     Medical: None     Non-medical: None   Tobacco Use    Smoking status: Current Every Day Smoker     Packs/day: 0.75     Years: 45.00     Pack years: 33.75     Types: Cigarettes     Last attempt to quit: 10/1/2012     Years since quittin.7    Smokeless tobacco: Never Used   Substance and Sexual Activity    Alcohol use: Yes     Alcohol/week: 3.0 standard drinks     Frequency: Monthly or less    Drug use: No    Sexual activity: Yes     Partners: Male   Lifestyle    Physical activity     Days per week: None     Minutes per session: None    Stress: None   Relationships    Social connections     Talks on phone: None     Gets together: None     Attends [employer] service: None     Active member of club or organization: None     Attends meetings of clubs or organizations: None     Relationship  status: None    Intimate partner violence     Fear of current or ex partner: None     Emotionally abused: None     Physically abused: None     Forced sexual activity: None   Other Topics Concern    None   Social History Narrative    .  Works at the school district.   works at [employer].      She has 3 grown children.    Lives on [address]. Quit smoking 9/2012          CURRENT MEDICATIONS:   Current Outpatient Medications   Medication Sig Dispense Refill    albuterol (PROAIR HFA/PROVENTIL HFA/VENTOLIN HFA) 108 (90 Base) MCG/ACT inhaler INHALE 2 PUFFS BY MOUTH DAILY AS NEEDED 54 g 3    atorvastatin (LIPITOR) 20 MG tablet Take 1 tablet (20 mg) by mouth at bedtime. 90 tablet 4    clobetasol (TEMOVATE) 0.05 % external ointment  (Patient taking differently: Apply topically 2 times daily as needed (Granuloma Annular).)      fluticasone-salmeterol (ADVAIR DISKUS) 250-50 MCG/ACT inhaler TWICE DAILY Strength: 250-50 MCG/DOSE 60 each 11    losartan-hydrochlorothiazide (HYZAAR) 50-12.5 MG tablet Take 1 tablet by mouth daily. 90 tablet 4    metoprolol succinate ER (TOPROL XL) 25 MG 24 hr tablet Take 1 tablet (25 mg) by mouth daily. 90 tablet 4    omeprazole (PRILOSEC) 20 MG DR capsule I capsule daily 180 capsule 4     No current facility-administered medications for this visit.       ALLERGIES:   Allergies   Allergen Reactions    Lisinopril      cough      ROS:   CONSTITUTIONAL: No reported fever or chills. No changes in weight.  ENT: No visual disturbance, ear ache, epistaxis or sore throat.   CARDIOVASCULAR: No recurrence of chest pains or chest pressure.  No palpitations. No increased lower extremity edema.   RESPIRATORY: No increased dyspnea.  No cough, wheezing or hemoptysis.  No reports of orthopnea or PND.  GI: No reported abdominal pain, melena or hematochezia.  : No reported hematuria or dysuria.  NEUROLOGICAL: No lightheadedness, dizziness, syncope, ataxia, paresthesias or weakness.   HEMATOLOGIC: No  "history of anemia. No bleeding or excessive bruising. No history of blood clots.   MUSCULOSKELETAL: No new joint pain or swelling, no muscle pain.  ENDOCRINOLOGIC: No temperature intolerance. No hair or skin changes.  SKIN: No abnormal rashes or sores, no unusual itching.    PHYSICAL EXAM:   /66 (BP Location: Right arm, Patient Position: Sitting, Cuff Size: Adult Regular)   Pulse 72   Temp 97.3  F (36.3  C) (Temporal)   Resp 16   Ht 1.626 m (5' 4\")   Wt 75.5 kg (166 lb 8 oz)   LMP  (LMP Unknown)   SpO2 96%   BMI 28.58 kg/m    GENERAL: The patient is a well-developed, well-nourished, in no apparent distress.  HEENT: Head is normocephalic and atraumatic. Eyes are symmetrical with normal visual tracking. No icterus, no xanthelasmas. Nares appeared normal without nasal drainage. Mucous membranes are moist, no cyanosis.  NECK: Supple. No cervical bruits, JVP not visible.   CHEST/ LUNGS: Lungs clear to auscultation, no rales, rhonchi or wheezes, no use of accessory muscles, no retractions, respirations unlabored and normal respiratory rate.   CARDIO: Regular rate and rhythm normal with S1 and S2, presence of grade III holosystolic murmur without radiation, no click or rub.   ABD: Abdomen is nondistended.   EXTREMITIES: No peripheral edema.  MUSCULOSKELETAL: No visible joint swelling.   NEUROLOGIC: Alert and oriented X3. Normal speech, gait and affect. No focal neurologic deficits.   SKIN: No jaundice. No rashes or visible skin lesions present. No ecchymosis.     EKG: NSR, rate 73 bpm.     LAB RESULTS:   Office Visit on 10/07/2024   Component Date Value Ref Range Status    Cholesterol 10/07/2024 157  <200 mg/dL Final    Triglycerides 10/07/2024 113  <150 mg/dL Final    Direct Measure HDL 10/07/2024 74  >=50 mg/dL Final    LDL Cholesterol Calculated 10/07/2024 60  <100 mg/dL Final    Non HDL Cholesterol 10/07/2024 83  <130 mg/dL Final    Patient Fasting > 8hrs? 10/07/2024 Yes   Final    TSH 10/07/2024 2.74  " 0.30 - 4.20 uIU/mL Final    Estimated Average Glucose 10/07/2024 108  <117 mg/dL Final    Hemoglobin A1C 10/07/2024 5.4  <5.7 % Final    Normal <5.7%   Prediabetes 5.7-6.4%    Diabetes 6.5% or higher     Note: Adopted from ADA consensus guidelines.         ASSESSMENT:   Charleen Huddleston presents for annual cardiology follow-up. She has followed cardiology with HTN, sinus tachycardia and identified hyperkinetic heart function with grade I diastolic dysfunction. She has a history of hypertension, hyperlipidemia, prediabetes, SLE, ongoing tobacco abuse, elevated liver enzymes, hepatic steatosis and alcohol-related thrombocytopenia.  Patient does have a family history of structural heart disease, mitral disease in her father and sister.  Today, patient reports feeling good. No chest pain or pressure. No increased dyspnea. No edema. She remains active and feeling good with normal activity tolerance. No specific concerns today. Admits that her sister was recently diagnosed with atrial fibrillation.       PLAN:   1. Hyperkinetic heart disease (Primary)  -Patient with hyperkinetic LV systolic function and grade I diastolic dysfunction. TTE without evidence of HCM or outflow tract obstruction. No valvular dysfunction.   -Continue with tight BP control, continue beta blocker.   -She has not required use of diuretic.   -Remain active/ regular exercise.  -Repeat echocardiogram in June 2026.    - Echocardiogram Complete; Future    2. Grade I diastolic dysfunction  -See above #1.    3. Essential hypertension  -BP controlled on current medication regimen.     4. Sinus tachycardia  -Controlled on current dose of Toprol XL, no palpitations.     5. Prediabetes  -Continued management by PCP.     6. Mixed hyperlipidemia  -Continue Atorvastatin 20 mg every night.       Follow-up with cardiology in 1 year, certainly sooner if needed.    Orders Placed This Encounter   Procedures    EKG 12-lead, tracing only (Same Day)    Echocardiogram  Complete     Thank you for allowing me to participate in the care of your patient. Please do not hesitate to contact me if you have any questions.     Bianca Whitney APRN CNP [facility]

## 2025-07-15 ENCOUNTER — HOSPITAL ENCOUNTER (OUTPATIENT)
Dept: RADIOLOGY | Facility: MEDICAL CENTER | Age: 30
End: 2025-07-15
Attending: PHYSICIAN ASSISTANT
Payer: COMMERCIAL

## 2025-07-15 DIAGNOSIS — E04.1 THYROID NODULE: ICD-10-CM

## 2025-07-15 LAB — CYTOLOGY REG CYTOL: NORMAL

## 2025-07-15 PROCEDURE — 10005 FNA BX W/US GDN 1ST LES: CPT

## 2025-07-15 PROCEDURE — 88173 CYTOPATH EVAL FNA REPORT: CPT | Performed by: PATHOLOGY

## 2025-07-15 NOTE — PROGRESS NOTES
US guided left thyroid nodule fine needle aspiration done by Dr. Aguilar; NON-SEDATION (no H&P required as this is a NON SEDATION procedure) left anterior aspect of neck access site, dressing CDI; 3 samples in 1 jar of cytolyt obtained, 1 sample in 1 vial afirma obtained and sent to lab. Pt tolerated the procedure well. Pt hemodynamically stable pre/intra/post procedure; all questions and concerns answered prior to being d/c; patient provided with appropriate education for procedure; pt d/c home.